# Patient Record
Sex: FEMALE | Race: WHITE | NOT HISPANIC OR LATINO
[De-identification: names, ages, dates, MRNs, and addresses within clinical notes are randomized per-mention and may not be internally consistent; named-entity substitution may affect disease eponyms.]

---

## 2021-08-11 ENCOUNTER — ASOB RESULT (OUTPATIENT)
Age: 26
End: 2021-08-11

## 2021-08-11 ENCOUNTER — APPOINTMENT (OUTPATIENT)
Dept: ANTEPARTUM | Facility: CLINIC | Age: 26
End: 2021-08-11
Payer: COMMERCIAL

## 2021-08-11 PROCEDURE — 76801 OB US < 14 WKS SINGLE FETUS: CPT

## 2021-08-11 PROCEDURE — 76813 OB US NUCHAL MEAS 1 GEST: CPT

## 2021-08-11 PROCEDURE — 36416 COLLJ CAPILLARY BLOOD SPEC: CPT

## 2021-10-06 ENCOUNTER — ASOB RESULT (OUTPATIENT)
Age: 26
End: 2021-10-06

## 2021-10-06 ENCOUNTER — APPOINTMENT (OUTPATIENT)
Dept: ANTEPARTUM | Facility: CLINIC | Age: 26
End: 2021-10-06
Payer: COMMERCIAL

## 2021-10-06 PROBLEM — Z00.00 ENCOUNTER FOR PREVENTIVE HEALTH EXAMINATION: Status: ACTIVE | Noted: 2021-10-06

## 2021-10-06 PROCEDURE — 76811 OB US DETAILED SNGL FETUS: CPT

## 2022-02-17 ENCOUNTER — OUTPATIENT (OUTPATIENT)
Dept: INPATIENT UNIT | Facility: HOSPITAL | Age: 27
LOS: 1 days | Discharge: HOME | End: 2022-02-17
Payer: COMMERCIAL

## 2022-02-17 VITALS
TEMPERATURE: 98 F | HEART RATE: 86 BPM | RESPIRATION RATE: 15 BRPM | SYSTOLIC BLOOD PRESSURE: 122 MMHG | DIASTOLIC BLOOD PRESSURE: 69 MMHG

## 2022-02-17 VITALS — HEART RATE: 86 BPM | SYSTOLIC BLOOD PRESSURE: 122 MMHG | DIASTOLIC BLOOD PRESSURE: 69 MMHG

## 2022-02-17 DIAGNOSIS — Z90.89 ACQUIRED ABSENCE OF OTHER ORGANS: Chronic | ICD-10-CM

## 2022-02-17 DIAGNOSIS — Z3A.00 WEEKS OF GESTATION OF PREGNANCY NOT SPECIFIED: ICD-10-CM

## 2022-02-17 DIAGNOSIS — O26.899 OTHER SPECIFIED PREGNANCY RELATED CONDITIONS, UNSPECIFIED TRIMESTER: ICD-10-CM

## 2022-02-17 PROCEDURE — 99202 OFFICE O/P NEW SF 15 MIN: CPT | Mod: 25

## 2022-02-17 PROCEDURE — 59025 FETAL NON-STRESS TEST: CPT | Mod: 26

## 2022-02-17 RX ORDER — SERTRALINE 25 MG/1
75 TABLET, FILM COATED ORAL
Qty: 0 | Refills: 0 | DISCHARGE

## 2022-02-17 NOTE — OB RN TRIAGE NOTE - NSNURSINGINSTR_OBGYN_ALL_OB_FT
pt evaluated for labor, early labor on exam, pt d/c to home with labor precautions.  instructions given by jett guillen d/w .

## 2022-02-17 NOTE — OB PROVIDER TRIAGE NOTE - HISTORY OF PRESENT ILLNESS
26y White Female  at 39w2d presents to triage c/o irregular contractions since 2am.  Reports +FM, no vaginal bleeding, no ROM or LOF  Prenatal care: [facility]; Denies any prenatal complications.

## 2022-02-17 NOTE — OB PROVIDER TRIAGE NOTE - NSHPPHYSICALEXAM_GEN_ALL_CORE
T(C): 36.8 (02-17-22 @ 20:18), Max: 36.8 (02-17-22 @ 20:18)  HR: 86 (02-17-22 @ 20:27) (86 - 86)  BP: 122/69 (02-17-22 @ 20:27) (122/69 - 122/69)  RR: 15 (02-17-22 @ 20:18) (15 - 15)    Heart: RRR  Lungs: CTA  Abdomen: Gravid, soft, NT    NST: Reactive with moderate variability, Category 1 tracing  [place]: Irregular contractions  VE: 1/50/-3, intact membranes

## 2022-02-17 NOTE — OB PROVIDER TRIAGE NOTE - PLAN OF CARE
D/c home with instructions  Labor precautions  Pt to monitor fetal kick counts  Pt to follow up with OB as scheduled  Pt to increase PO hydration  Pt instructed to return to triage if decreased fetal movements, strong contractions, vaginal bleeding or LOF.

## 2022-02-17 NOTE — OB RN TRIAGE NOTE - FALL HARM RISK - UNIVERSAL INTERVENTIONS
Bed in lowest position, wheels locked, appropriate side rails in place/Call bell, personal items and telephone in reach/Instruct patient to call for assistance before getting out of bed or chair/Non-slip footwear when patient is out of bed/[place] to call system/Physically safe environment - no spills, clutter or unnecessary equipment/Purposeful Proactive Rounding/Room/bathroom lighting operational, light cord in reach

## 2022-02-17 NOTE — OB PROVIDER TRIAGE NOTE - NSOBPROVIDERNOTE_OBGYN_ALL_OB_FT
26y White Female  at 39w2d, no evidence of labor at this time  Reassuring fetal status  d/w Dr Maynard  D/c home with instructions  Labor precautions  Pt to monitor fetal kick counts  Pt to follow up with OB as scheduled  Pt to increase PO hydration  Pt instructed to return to triage if decreased fetal movements, strong contractions, vaginal bleeding or LOF.

## 2022-02-18 ENCOUNTER — INPATIENT (INPATIENT)
Facility: HOSPITAL | Age: 27
LOS: 1 days | Discharge: HOME | End: 2022-02-20
Attending: OBSTETRICS & GYNECOLOGY | Admitting: OBSTETRICS & GYNECOLOGY

## 2022-02-18 ENCOUNTER — TRANSCRIPTION ENCOUNTER (OUTPATIENT)
Age: 27
End: 2022-02-18

## 2022-02-18 VITALS
DIASTOLIC BLOOD PRESSURE: 77 MMHG | RESPIRATION RATE: 16 BRPM | SYSTOLIC BLOOD PRESSURE: 114 MMHG | HEART RATE: 95 BPM | TEMPERATURE: 98 F

## 2022-02-18 DIAGNOSIS — Z3A.00 WEEKS OF GESTATION OF PREGNANCY NOT SPECIFIED: ICD-10-CM

## 2022-02-18 DIAGNOSIS — O26.899 OTHER SPECIFIED PREGNANCY RELATED CONDITIONS, UNSPECIFIED TRIMESTER: ICD-10-CM

## 2022-02-18 DIAGNOSIS — Z90.89 ACQUIRED ABSENCE OF OTHER ORGANS: Chronic | ICD-10-CM

## 2022-02-18 LAB
BASOPHILS # BLD AUTO: 0.03 K/UL — SIGNIFICANT CHANGE UP (ref 0–0.2)
BASOPHILS NFR BLD AUTO: 0.2 % — SIGNIFICANT CHANGE UP (ref 0–2)
BLD GP AB SCN SERPL QL: NEGATIVE — SIGNIFICANT CHANGE UP
COVID-19 SPIKE DOMAIN AB INTERP: POSITIVE
COVID-19 SPIKE DOMAIN ANTIBODY RESULT: >250 U/ML — HIGH
EOSINOPHIL # BLD AUTO: 0.04 K/UL — SIGNIFICANT CHANGE UP (ref 0–0.5)
EOSINOPHIL NFR BLD AUTO: 0.3 % — SIGNIFICANT CHANGE UP (ref 0–6)
HBV SURFACE AG SER-ACNC: SIGNIFICANT CHANGE UP
HCT VFR BLD CALC: 37.3 % — SIGNIFICANT CHANGE UP (ref 34.5–45)
HGB BLD-MCNC: 12.1 G/DL — SIGNIFICANT CHANGE UP (ref 11.5–15.5)
HIV 1+2 AB+HIV1 P24 AG SERPL QL IA: SIGNIFICANT CHANGE UP
IANC: 11.86 K/UL — HIGH (ref 1.5–8.5)
IMM GRANULOCYTES NFR BLD AUTO: 0.7 % — SIGNIFICANT CHANGE UP (ref 0–1.5)
LYMPHOCYTES # BLD AUTO: 1.49 K/UL — SIGNIFICANT CHANGE UP (ref 1–3.3)
LYMPHOCYTES # BLD AUTO: 10.5 % — LOW (ref 13–44)
MCHC RBC-ENTMCNC: 25.7 PG — LOW (ref 27–34)
MCHC RBC-ENTMCNC: 32.4 GM/DL — SIGNIFICANT CHANGE UP (ref 32–36)
MCV RBC AUTO: 79.4 FL — LOW (ref 80–100)
MONOCYTES # BLD AUTO: 0.65 K/UL — SIGNIFICANT CHANGE UP (ref 0–0.9)
MONOCYTES NFR BLD AUTO: 4.6 % — SIGNIFICANT CHANGE UP (ref 2–14)
NEUTROPHILS # BLD AUTO: 11.86 K/UL — HIGH (ref 1.8–7.4)
NEUTROPHILS NFR BLD AUTO: 83.7 % — HIGH (ref 43–77)
NRBC # BLD: 0 /100 WBCS — SIGNIFICANT CHANGE UP
NRBC # FLD: 0 K/UL — SIGNIFICANT CHANGE UP
PLATELET # BLD AUTO: 288 K/UL — SIGNIFICANT CHANGE UP (ref 150–400)
RBC # BLD: 4.7 M/UL — SIGNIFICANT CHANGE UP (ref 3.8–5.2)
RBC # FLD: 13.8 % — SIGNIFICANT CHANGE UP (ref 10.3–14.5)
RH IG SCN BLD-IMP: POSITIVE — SIGNIFICANT CHANGE UP
RH IG SCN BLD-IMP: POSITIVE — SIGNIFICANT CHANGE UP
SARS-COV-2 IGG+IGM SERPL QL IA: >250 U/ML — HIGH
SARS-COV-2 IGG+IGM SERPL QL IA: POSITIVE
SARS-COV-2 RNA SPEC QL NAA+PROBE: SIGNIFICANT CHANGE UP
T PALLIDUM AB TITR SER: NEGATIVE — SIGNIFICANT CHANGE UP
WBC # BLD: 14.17 K/UL — HIGH (ref 3.8–10.5)
WBC # FLD AUTO: 14.17 K/UL — HIGH (ref 3.8–10.5)

## 2022-02-18 RX ORDER — DIPHENHYDRAMINE HCL 50 MG
25 CAPSULE ORAL EVERY 6 HOURS
Refills: 0 | Status: DISCONTINUED | OUTPATIENT
Start: 2022-02-18 | End: 2022-02-20

## 2022-02-18 RX ORDER — SODIUM CHLORIDE 9 MG/ML
1000 INJECTION, SOLUTION INTRAVENOUS
Refills: 0 | Status: DISCONTINUED | OUTPATIENT
Start: 2022-02-18 | End: 2022-02-18

## 2022-02-18 RX ORDER — AER TRAVELER 0.5 G/1
1 SOLUTION RECTAL; TOPICAL
Qty: 0 | Refills: 0 | DISCHARGE
Start: 2022-02-18

## 2022-02-18 RX ORDER — ERGOCALCIFEROL 1.25 MG/1
0 CAPSULE ORAL
Qty: 0 | Refills: 0 | DISCHARGE

## 2022-02-18 RX ORDER — SERTRALINE 25 MG/1
75 TABLET, FILM COATED ORAL DAILY
Refills: 0 | Status: DISCONTINUED | OUTPATIENT
Start: 2022-02-18 | End: 2022-02-19

## 2022-02-18 RX ORDER — OXYTOCIN 10 UNIT/ML
333.33 VIAL (ML) INJECTION
Qty: 20 | Refills: 0 | Status: DISCONTINUED | OUTPATIENT
Start: 2022-02-18 | End: 2022-02-18

## 2022-02-18 RX ORDER — SODIUM CHLORIDE 9 MG/ML
3 INJECTION INTRAMUSCULAR; INTRAVENOUS; SUBCUTANEOUS EVERY 8 HOURS
Refills: 0 | Status: DISCONTINUED | OUTPATIENT
Start: 2022-02-18 | End: 2022-02-20

## 2022-02-18 RX ORDER — OXYCODONE HYDROCHLORIDE 5 MG/1
5 TABLET ORAL
Refills: 0 | Status: DISCONTINUED | OUTPATIENT
Start: 2022-02-18 | End: 2022-02-20

## 2022-02-18 RX ORDER — HYDROCORTISONE 1 %
1 OINTMENT (GRAM) TOPICAL
Qty: 0 | Refills: 0 | DISCHARGE
Start: 2022-02-18

## 2022-02-18 RX ORDER — ACETAMINOPHEN 500 MG
975 TABLET ORAL
Refills: 0 | Status: DISCONTINUED | OUTPATIENT
Start: 2022-02-18 | End: 2022-02-20

## 2022-02-18 RX ORDER — IBUPROFEN 200 MG
600 TABLET ORAL EVERY 6 HOURS
Refills: 0 | Status: COMPLETED | OUTPATIENT
Start: 2022-02-18 | End: 2023-01-17

## 2022-02-18 RX ORDER — TETANUS TOXOID, REDUCED DIPHTHERIA TOXOID AND ACELLULAR PERTUSSIS VACCINE, ADSORBED 5; 2.5; 8; 8; 2.5 [IU]/.5ML; [IU]/.5ML; UG/.5ML; UG/.5ML; UG/.5ML
0.5 SUSPENSION INTRAMUSCULAR ONCE
Refills: 0 | Status: DISCONTINUED | OUTPATIENT
Start: 2022-02-18 | End: 2022-02-20

## 2022-02-18 RX ORDER — AER TRAVELER 0.5 G/1
1 SOLUTION RECTAL; TOPICAL EVERY 4 HOURS
Refills: 0 | Status: DISCONTINUED | OUTPATIENT
Start: 2022-02-18 | End: 2022-02-20

## 2022-02-18 RX ORDER — SENNA PLUS 8.6 MG/1
1 TABLET ORAL
Refills: 0 | Status: DISCONTINUED | OUTPATIENT
Start: 2022-02-18 | End: 2022-02-20

## 2022-02-18 RX ORDER — DIBUCAINE 1 %
1 OINTMENT (GRAM) RECTAL
Qty: 0 | Refills: 0 | DISCHARGE
Start: 2022-02-18

## 2022-02-18 RX ORDER — OXYTOCIN 10 UNIT/ML
2 VIAL (ML) INJECTION
Qty: 30 | Refills: 0 | Status: DISCONTINUED | OUTPATIENT
Start: 2022-02-18 | End: 2022-02-18

## 2022-02-18 RX ORDER — IBUPROFEN 200 MG
600 TABLET ORAL EVERY 6 HOURS
Refills: 0 | Status: DISCONTINUED | OUTPATIENT
Start: 2022-02-18 | End: 2022-02-20

## 2022-02-18 RX ORDER — PRAMOXINE HYDROCHLORIDE 150 MG/15G
1 AEROSOL, FOAM RECTAL EVERY 4 HOURS
Refills: 0 | Status: DISCONTINUED | OUTPATIENT
Start: 2022-02-18 | End: 2022-02-20

## 2022-02-18 RX ORDER — KETOROLAC TROMETHAMINE 30 MG/ML
30 SYRINGE (ML) INJECTION ONCE
Refills: 0 | Status: DISCONTINUED | OUTPATIENT
Start: 2022-02-18 | End: 2022-02-18

## 2022-02-18 RX ORDER — BENZOCAINE 10 %
1 GEL (GRAM) MUCOUS MEMBRANE EVERY 6 HOURS
Refills: 0 | Status: DISCONTINUED | OUTPATIENT
Start: 2022-02-18 | End: 2022-02-20

## 2022-02-18 RX ORDER — DIBUCAINE 1 %
1 OINTMENT (GRAM) RECTAL EVERY 6 HOURS
Refills: 0 | Status: DISCONTINUED | OUTPATIENT
Start: 2022-02-18 | End: 2022-02-20

## 2022-02-18 RX ORDER — LANOLIN
1 OINTMENT (GRAM) TOPICAL
Qty: 0 | Refills: 0 | DISCHARGE
Start: 2022-02-18

## 2022-02-18 RX ORDER — HYDROCORTISONE 1 %
1 OINTMENT (GRAM) TOPICAL EVERY 6 HOURS
Refills: 0 | Status: DISCONTINUED | OUTPATIENT
Start: 2022-02-18 | End: 2022-02-20

## 2022-02-18 RX ORDER — LANOLIN
1 OINTMENT (GRAM) TOPICAL EVERY 6 HOURS
Refills: 0 | Status: DISCONTINUED | OUTPATIENT
Start: 2022-02-18 | End: 2022-02-20

## 2022-02-18 RX ORDER — SIMETHICONE 80 MG/1
80 TABLET, CHEWABLE ORAL EVERY 4 HOURS
Refills: 0 | Status: DISCONTINUED | OUTPATIENT
Start: 2022-02-18 | End: 2022-02-20

## 2022-02-18 RX ORDER — MAGNESIUM HYDROXIDE 400 MG/1
30 TABLET, CHEWABLE ORAL
Refills: 0 | Status: DISCONTINUED | OUTPATIENT
Start: 2022-02-18 | End: 2022-02-20

## 2022-02-18 RX ORDER — OXYCODONE HYDROCHLORIDE 5 MG/1
5 TABLET ORAL ONCE
Refills: 0 | Status: DISCONTINUED | OUTPATIENT
Start: 2022-02-18 | End: 2022-02-20

## 2022-02-18 RX ORDER — IBUPROFEN 200 MG
1 TABLET ORAL
Qty: 0 | Refills: 0 | DISCHARGE
Start: 2022-02-18

## 2022-02-18 RX ADMIN — SODIUM CHLORIDE 3 MILLILITER(S): 9 INJECTION INTRAMUSCULAR; INTRAVENOUS; SUBCUTANEOUS at 23:27

## 2022-02-18 RX ADMIN — Medication 1000 MILLIUNIT(S)/MIN: at 14:52

## 2022-02-18 RX ADMIN — Medication 975 MILLIGRAM(S): at 21:21

## 2022-02-18 RX ADMIN — Medication 975 MILLIGRAM(S): at 21:51

## 2022-02-18 RX ADMIN — Medication 600 MILLIGRAM(S): at 23:56

## 2022-02-18 RX ADMIN — Medication 30 MILLIGRAM(S): at 17:45

## 2022-02-18 RX ADMIN — Medication 30 MILLIGRAM(S): at 17:33

## 2022-02-18 RX ADMIN — Medication 2 MILLIUNIT(S)/MIN: at 09:11

## 2022-02-18 NOTE — OB RN PATIENT PROFILE - WEIGHT IN KG
Call [facility] First!  Call [facility] [phone] if you develop: fever 100.4 or greater, new/uncontrolled pain, uncontrolled side effects, or any other questions or concerns.  Remember to increase oral fluid intake to prevent dehydration.  Protect yourself from infection by washing your hands for 30 seconds and avoiding anyone who may be sick.  Please practice usage of face masks and social distancing to protect yourself and others.     73.5

## 2022-02-18 NOTE — DISCHARGE NOTE OB - PATIENT PORTAL LINK FT
You can access the FollowMyHealth Patient Portal offered by [facility] by registering at the following website: [email or web address]. By joining [facility]’s FollowMyHealth portal, you will also be able to view your health information using other applications (apps) compatible with our system.

## 2022-02-18 NOTE — OB PROVIDER DELIVERY SUMMARY - NSPROVIDERDELIVERYNOTE_OBGYN_ALL_OB_FT
Delivery of liveborn female infant from OA position. Head, shoulders, and body delivered without complication. Infant was suctioned. No mec. Delayed cord clamping and infant was passed to mother. Cord clamped and cut. Placenta delivered intact with a 3 vessel cord. Fundal massage was given and uterine fundus was found to be firm. Vaginal exam revealed an intact cervix, vaginal walls and sulci. Patient had a second degree laceration in the perineum that was repaired with 2.0 chromic suture. Excellent hemostasis was noted. Patient was stable and went to recovery. Count was correct x 2.

## 2022-02-18 NOTE — OB RN DELIVERY SUMMARY - NSSELHIDDEN_OBGYN_ALL_OB_FT
[NS_DeliveryAttending1_OBGYN_ALL_OB_FT:[id number]=],[NS_DeliveryRN_OBGYN_ALL_OB_FT:[id number]=],[NS_CirculateRN2_OBGYN_ALL_OB_FT:[id number]=]

## 2022-02-18 NOTE — OB RN PATIENT PROFILE - NSICDXNOPASTMEDICALHX_GEN_ALL_CORE
Spine appears normal, movement of extremities grossly intact. <-- Click to add NO pertinent Past Medical History

## 2022-02-18 NOTE — DISCHARGE NOTE OB - CARE PROVIDER_API CALL
Jenn Major)  Obstetrics and Gynecology  [address] [place] [address]  Phone: [phone]  Fax: [phone]  Follow Up Time: 1 month

## 2022-02-18 NOTE — OB PROVIDER H&P - NSHPPHYSICALEXAM_GEN_ALL_CORE
T(C): 36.7 (02-18-22 @ 04:09), Max: 36.8 (02-17-22 @ 20:18)  HR: 95 (02-18-22 @ 04:17) (86 - 95)  BP: 114/77 (02-18-22 @ 04:17) (114/77 - 122/69)  RR: 16 (02-18-22 @ 04:09) (15 - 16)    Heart: RRR  Lungs: CTA  Abdomen: Gravid, soft, NT    NST: Reactive with moderate variability, Category 1 tracing  [place]: Regular contractions q 2mins apart  VE: 5/80/-3, bulging membranes

## 2022-02-18 NOTE — OB RN DELIVERY SUMMARY - BABY A: APGAR 1 MIN SCORE, DELIVERY
8 Pt has a question regarding recent medication. Does she need to be seen again prior to any refills for the muscle relaxer?

## 2022-02-18 NOTE — DISCHARGE NOTE OB - NS AS DC FU INST LIST INST
[facility]  ED Nurse Handoff Report    ED Chief complaint: Generalized Weakness and Covid Concern      ED Diagnosis:   Final diagnoses:   Pneumonia due to 2019 novel coronavirus   Pneumonia of right lower lobe due to infectious organism   Encephalopathy   Acute respiratory failure with hypoxia (H)       Code Status: hospitalist to address    Allergies:   Allergies   Allergen Reactions     Gluten Meal GI Disturbance     Lactose GI Disturbance and Other (See Comments)     Dairy Products - brain fog     Mold Cough     Cough, sneeze, pneumonia     Prednisone Nausea and Vomiting     Acetaminophen Other (See Comments) and Rash     pt states she can take acetaminophen     Caffeine Palpitations and Other (See Comments)     Penicillins Other (See Comments) and Rash     Sulfa Drugs Other (See Comments) and Rash       Patient Story: patient BIBA from home. Pt has not been feeling well over last couple weeks: fatigue, cough, SOB and diarrhea, fever, intermittent confusion and trouble getting her words out. Patient took an at home covid test today which was positive. When EMS arrived pt was upper 80s on room air. Patient 88% on room air in ED, pt currently on 4-5L oxygen in ED and oxygen saturation between 92-93%. Patient medicated with ibuprofen and fever has come down. Since fever came down patient's mentation has improved. COVID test in ED positive.     Chest CT scan showing:   IMPRESSION:  1.  Multifocal ground-glass opacities suspicious for COVID-19. There is, however, more focal consolidation in the perihilar aspect of the right lower lobe that may represent superimposed bacterial pneumonia.  2.  No evidence of pulmonary embolism  Focused Assessment:  See above     Treatments and/or interventions provided: see MAR, oxygen on at 4-5L oxygen  Patient's response to treatments and/or interventions: mentation improved when fever was reduced    To be done/followed up on inpatient unit:  none at this time    Does  no this patient have any cognitive concerns?: Difficulty answering questions at times, intermittent confusion    Activity level - Baseline/Home:  Independent  Activity Level - Current:   Stand with Assist    Patient's Preferred language: English   Needed?: No    Isolation: COVID positive  Infection: COVID    Patient tested for COVID 19 prior to admission: YES  Bariatric?: No    Vital Signs:   Vitals:    11/28/21 1603 11/28/21 1605 11/28/21 1627 11/28/21 1731   BP: 124/68      Pulse: 87  82 76   Resp:  18 18 22   Temp:    99.8  F (37.7  C)   TempSrc:    Oral   SpO2:   94% 92%       Cardiac Rhythm:Cardiac Rhythm: Normal sinus rhythm    Was the PSS-3 completed:   Yes  What interventions are required if any?               Family Comments: admission and plan of care discussed with pt's daughter (info in demographics)  OBS brochure/video discussed/provided to patient/family: N/A              Name of person given brochure if not patient:               Relationship to patient:     For the majority of the shift this patient's behavior was Green.   Behavioral interventions performed were .    ED NURSE PHONE NUMBER: [phone]

## 2022-02-18 NOTE — OB PROVIDER H&P - ASSESSMENT
26y White Female  at 39w2d in active labor  GBS: Negative  D/w Dr Maynard  -Admit to labor and delivery  -Pain Management prn  -Cont EFM/[place]  -Admission labs: CBC, RPR, T&S  -IV hydration  -Clear liquid diet

## 2022-02-18 NOTE — OB RN PATIENT PROFILE - FALL HARM RISK - UNIVERSAL INTERVENTIONS
Bed in lowest position, wheels locked, appropriate side rails in place/Call bell, personal items and telephone in reach/Instruct patient to call for assistance before getting out of bed or chair/Non-slip footwear when patient is out of bed/[place] to call system/Physically safe environment - no spills, clutter or unnecessary equipment/Purposeful Proactive Rounding/Room/bathroom lighting operational, light cord in reach

## 2022-02-18 NOTE — DISCHARGE NOTE OB - NS MD DC FALL RISK RISK
For information on Fall & Injury Prevention, visit: [email or web address] OR  [email or web address] OR  https://www.cdc.gov/steadi/patient.html

## 2022-02-18 NOTE — CHART NOTE - NSCHARTNOTEFT_GEN_A_CORE
pt was evaluated at bedside   ve: 9.5/100/0 station   jayro every 2 minutes   peanutball placed   continue pitocin augmentation   anticipate vaginal delivery

## 2022-02-18 NOTE — OB RN DELIVERY SUMMARY - NS_SEPSISRSKCALC_OBGYN_ALL_OB_FT
EOS calculated successfully. EOS Risk Factor: 0.5/1000 live births ([facility] national incidence); GA=39w3d; Temp=99.14; ROM=8.85; GBS='Negative'; Antibiotics='No antibiotics or any antibiotics < 2 hrs prior to birth'

## 2022-02-18 NOTE — DISCHARGE NOTE OB - CARE PLAN
Principal Discharge DX:	 (normal spontaneous vaginal delivery)  Assessment and plan of treatment:	post partum care   1

## 2022-02-18 NOTE — OB RN TRIAGE NOTE - FALL HARM RISK - UNIVERSAL INTERVENTIONS
Bed in lowest position, wheels locked, appropriate side rails in place/Call bell, personal items and telephone in reach/Instruct patient to call for assistance before getting out of bed or chair/Non-slip footwear when patient is out of bed/[place] to call system/Physically safe environment - no spills, clutter or unnecessary equipment/Purposeful Proactive Rounding/Room/bathroom lighting operational, light cord in reach

## 2022-02-18 NOTE — OB PROVIDER H&P - HISTORY OF PRESENT ILLNESS
26y White Female  at 39w2d returns to triage c/o stronger and painful contractions  Reports +FM, no vaginal bleeding, no ROM or LOF  Prenatal care: [facility]; Denies any prenatal complications.  GBS: Negative 22

## 2022-02-18 NOTE — DISCHARGE NOTE OB - MEDICATION SUMMARY - MEDICATIONS TO TAKE
I will START or STAY ON the medications listed below when I get home from the hospital:    ibuprofen 600 mg oral tablet  -- 1 tab(s) by mouth every 6 hours  -- Indication: For     Zoloft  -- 75 milligram(s) by mouth once a day  -- Indication: For     hydrocortisone 1% topical cream  -- 1 application on skin every 6 hours, As needed, Moderate Pain (4-6)  -- Indication: For     dibucaine 1% topical ointment  -- 1 application on skin every 6 hours, As needed, Perineal discomfort  -- Indication: For     lanolin topical ointment  -- 1 application on skin every 6 hours, As needed, nipple soreness  -- Indication: For     witch hazel 50% topical pad  -- 1 application on skin every 4 hours, As needed, Perineal discomfort  -- Indication: For    I will START or STAY ON the medications listed below when I get home from the hospital:    ibuprofen 600 mg oral tablet  -- 1 tab(s) by mouth every 6 hours  -- Indication: For     acetaminophen 325 mg oral tablet  -- 3 tab(s) by mouth every 8 hours, As Needed  -- Indication: For  (normal spontaneous vaginal delivery)    Zoloft  -- 75 milligram(s) by mouth once a day  -- Indication: For     hydrocortisone 1% topical cream  -- 1 application on skin every 6 hours, As needed, Moderate Pain (4-6)  -- Indication: For     dibucaine 1% topical ointment  -- 1 application on skin every 6 hours, As needed, Perineal discomfort  -- Indication: For     lanolin topical ointment  -- 1 application on skin every 6 hours, As needed, nipple soreness  -- Indication: For     witch hazel 50% topical pad  -- 1 application on skin every 4 hours, As needed, Perineal discomfort  -- Indication: For

## 2022-02-18 NOTE — DISCHARGE NOTE OB - MATERIALS PROVIDED
Vaccinations/[facility]  Screening Program/  Immunization Record/Breastfeeding Log/Breastfeeding Mother’s Support Group Information/Guide to Postpartum Care/[facility] Hearing Screen Program/Back To Sleep Handout/Shaken Baby Prevention Handout/Breastfeeding Guide and Packet/Birth Certificate Instructions/Discharge Medication Information for Patients and Families Pocket Guide/Tdap Vaccination (VIS Pub Date: 2012)

## 2022-02-18 NOTE — OB PROVIDER H&P - PROBLEM SELECTOR PLAN 1
-Admit to labor and delivery  -Pain Management prn  -Cont EFM/[place]  -Admission labs: CBC, RPR, T&S  -IV hydration  -Clear liquid diet

## 2022-02-19 RX ORDER — SERTRALINE 25 MG/1
75 TABLET, FILM COATED ORAL DAILY
Refills: 0 | Status: DISCONTINUED | OUTPATIENT
Start: 2022-02-19 | End: 2022-02-20

## 2022-02-19 RX ORDER — ACETAMINOPHEN 500 MG
3 TABLET ORAL
Qty: 0 | Refills: 0 | DISCHARGE
Start: 2022-02-19

## 2022-02-19 RX ADMIN — Medication 600 MILLIGRAM(S): at 00:26

## 2022-02-19 RX ADMIN — SODIUM CHLORIDE 3 MILLILITER(S): 9 INJECTION INTRAMUSCULAR; INTRAVENOUS; SUBCUTANEOUS at 14:20

## 2022-02-19 RX ADMIN — Medication 975 MILLIGRAM(S): at 22:21

## 2022-02-19 RX ADMIN — Medication 1 TABLET(S): at 11:55

## 2022-02-19 RX ADMIN — Medication 600 MILLIGRAM(S): at 12:25

## 2022-02-19 RX ADMIN — SERTRALINE 75 MILLIGRAM(S): 25 TABLET, FILM COATED ORAL at 17:08

## 2022-02-19 RX ADMIN — Medication 600 MILLIGRAM(S): at 17:07

## 2022-02-19 RX ADMIN — SODIUM CHLORIDE 3 MILLILITER(S): 9 INJECTION INTRAMUSCULAR; INTRAVENOUS; SUBCUTANEOUS at 22:21

## 2022-02-19 RX ADMIN — Medication 975 MILLIGRAM(S): at 21:40

## 2022-02-19 RX ADMIN — Medication 600 MILLIGRAM(S): at 13:40

## 2022-02-19 RX ADMIN — Medication 600 MILLIGRAM(S): at 06:00

## 2022-02-19 RX ADMIN — Medication 600 MILLIGRAM(S): at 11:55

## 2022-02-19 RX ADMIN — Medication 975 MILLIGRAM(S): at 03:21

## 2022-02-19 RX ADMIN — Medication 975 MILLIGRAM(S): at 08:55

## 2022-02-19 RX ADMIN — Medication 975 MILLIGRAM(S): at 09:30

## 2022-02-19 RX ADMIN — SODIUM CHLORIDE 3 MILLILITER(S): 9 INJECTION INTRAMUSCULAR; INTRAVENOUS; SUBCUTANEOUS at 06:06

## 2022-02-19 RX ADMIN — Medication 600 MILLIGRAM(S): at 06:30

## 2022-02-19 RX ADMIN — SENNA PLUS 1 TABLET(S): 8.6 TABLET ORAL at 06:00

## 2022-02-19 RX ADMIN — Medication 975 MILLIGRAM(S): at 02:51

## 2022-02-19 NOTE — PROGRESS NOTE ADULT - ASSESSMENT
PPD 1 s/p  recovering appropriately   [] continue routine postpartum care   [] encourage ambulation   [] continue pain management PRN   [] discharge planning

## 2022-02-19 NOTE — LACTATION INITIAL EVALUATION - LACTATION INTERVENTIONS
initiate/review safe skin-to-skin/initiate/review hand expression/initiate/review techniques for position and latch/review techniques to increase milk supply/initiate/review finger suck/initiate/review breast massage/compression/initiate/review alternate feeding method/reviewed components of an effective feeding and at least 8 effective feedings per day required/reviewed importance of monitoring infant diapers, the breastfeeding log, and minimum output each day/reviewed risks of artificial nipples/reviewed benefits and recommendations for rooming in/reviewed feeding on demand/by cue at least 8 times a day/reviewed indications of inadequate milk transfer that would require supplementation

## 2022-02-19 NOTE — LACTATION INITIAL EVALUATION - INTERVENTION OUTCOME
Will follow up on infant less than 24 hrs. 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

## 2022-02-20 VITALS
SYSTOLIC BLOOD PRESSURE: 120 MMHG | OXYGEN SATURATION: 98 % | HEART RATE: 81 BPM | DIASTOLIC BLOOD PRESSURE: 80 MMHG | RESPIRATION RATE: 18 BRPM | TEMPERATURE: 98 F

## 2022-02-20 RX ADMIN — Medication 975 MILLIGRAM(S): at 03:17

## 2022-02-20 RX ADMIN — Medication 975 MILLIGRAM(S): at 03:50

## 2022-02-20 RX ADMIN — Medication 600 MILLIGRAM(S): at 08:39

## 2022-02-20 RX ADMIN — Medication 600 MILLIGRAM(S): at 09:09

## 2022-02-20 RX ADMIN — Medication 600 MILLIGRAM(S): at 00:09

## 2022-02-20 RX ADMIN — Medication 600 MILLIGRAM(S): at 00:40

## 2022-02-20 NOTE — PROGRESS NOTE ADULT - SUBJECTIVE AND OBJECTIVE BOX
patient seen today  doing well today PPD2 after   she si doing well  labs vitals reviewed, stable  discussed precautions with discharge  stable at this time for DC.
INTERVAL HPI/OVERNIGHT EVENTS: Pt seen and examined at bedside.  Doing well, denies complaints. +voiding without difficulty, +ambulation, candi reg diet. denies heavy lochia     MEDICATIONS  (STANDING):  acetaminophen     Tablet .. 975 milliGRAM(s) Oral <User Schedule>  diphtheria/tetanus/pertussis (acellular) Vaccine (ADAcel) 0.5 milliLiter(s) IntraMuscular once  ibuprofen  Tablet. 600 milliGRAM(s) Oral every 6 hours  prenatal multivitamin 1 Tablet(s) Oral daily  senna 1 Tablet(s) Oral two times a day  sertraline Concentrate 75 milliGRAM(s) Oral daily  sodium chloride 0.9% lock flush 3 milliLiter(s) IV Push every 8 hours    MEDICATIONS  (PRN):  benzocaine 20%/menthol 0.5% Spray 1 Spray(s) Topical every 6 hours PRN for Perineal discomfort  dibucaine 1% Ointment 1 Application(s) Topical every 6 hours PRN Perineal discomfort  diphenhydrAMINE 25 milliGRAM(s) Oral every 6 hours PRN Pruritus  hydrocortisone 1% Cream 1 Application(s) Topical every 6 hours PRN Moderate Pain (4-6)  lanolin Ointment 1 Application(s) Topical every 6 hours PRN nipple soreness  magnesium hydroxide Suspension 30 milliLiter(s) Oral two times a day PRN Constipation  oxyCODONE    IR 5 milliGRAM(s) Oral every 3 hours PRN Moderate to Severe Pain (4-10)  oxyCODONE    IR 5 milliGRAM(s) Oral once PRN Moderate to Severe Pain (4-10)  pramoxine 1%/zinc 5% Cream 1 Application(s) Topical every 4 hours PRN Moderate Pain (4-6)  simethicone 80 milliGRAM(s) Chew every 4 hours PRN Gas  witch hazel Pads 1 Application(s) Topical every 4 hours PRN Perineal discomfort      Vital Signs Last 24 Hrs  T(C): 36.8 (19 Feb 2022 05:48), Max: 37.3 (18 Feb 2022 11:26)  T(F): 98.2 (19 Feb 2022 05:48), Max: 99.14 (18 Feb 2022 11:26)  HR: 92 (19 Feb 2022 05:48) (69 - 117)  BP: 114/74 (19 Feb 2022 05:48) (92/52 - 131/84)  BP(mean): --  RR: 18 (19 Feb 2022 05:48) (17 - 18)  SpO2: 98% (19 Feb 2022 05:48) (83% - 100%)    PHYSICAL EXAM:    GA: NAD, A+0 x 3  Abd: ( + ) BS, soft, nontender, nondistended, no rebound or guarding,   Uterus: Fundus midline; firm    LABS:                        12.1   14.17 )-----------( 288      ( 18 Feb 2022 05:24 )             37.3                   RADIOLOGY & ADDITIONAL TESTS:

## 2022-06-03 NOTE — OB PROVIDER H&P - NSRISKFACTORS_OBGYN_ALL_OB
I11 0  [facility] 75  coding opportunities          Chart Reviewed number of suggestions sent to Provider: 1     Patients Insurance     Medicare Insurance: [employer]
No

## 2022-10-04 NOTE — OB RN PATIENT PROFILE - NS_PRENATALLABSOURCEGBS36_OBGYN_ALL_OB
Current Issues/Problems reviewed on call:Patient's nephewreported pt will need transportation when he moves to [place].  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    Call completed with: Patient's nephew, Darren COLÓN.    Social Determinants of Health Identified: Coping/Stress and Transportation Needs    Community Resources Needed? Yes   If Yes, what resources were provided?  Maria Antonia recommended nephew have pt complete POA of Property.  Sw suggested looking for homemaker at local organizations or churches.  Sw noted that nephew could request a Care manager from Dr Herndon if he wants someone to monitor patient's medical conditions.    Time Frame for Follow up if needed: No future follow up needed due to Nephew agreed to call sw as needed.    SW Goal reviewed with patient. Patient agrees with the MARIA ANTONIA plan of care and call follow-up plan.     Care Plan reviewed with Dr Herndon on 10/4/22 via EMR   hard copy, drawn during this pregnancy

## 2023-06-02 NOTE — LACTATION INITIAL EVALUATION - BREAST TRAUMA OR BURNS
Patient states [facility] told him doctor cancel his Diltiazem. Patient would like to know why.    no

## 2024-06-05 PROBLEM — Z78.9 OTHER SPECIFIED HEALTH STATUS: Chronic | Status: ACTIVE | Noted: 2022-02-17

## 2024-06-13 ENCOUNTER — LABORATORY RESULT (OUTPATIENT)
Age: 29
End: 2024-06-13

## 2024-06-14 ENCOUNTER — ASOB RESULT (OUTPATIENT)
Age: 29
End: 2024-06-14

## 2024-06-14 ENCOUNTER — APPOINTMENT (OUTPATIENT)
Dept: ANTEPARTUM | Facility: CLINIC | Age: 29
End: 2024-06-14
Payer: COMMERCIAL

## 2024-06-14 PROCEDURE — 76813 OB US NUCHAL MEAS 1 GEST: CPT | Mod: 59

## 2024-06-14 PROCEDURE — 36415 COLL VENOUS BLD VENIPUNCTURE: CPT

## 2024-06-14 PROCEDURE — 76801 OB US < 14 WKS SINGLE FETUS: CPT

## 2024-08-09 ENCOUNTER — APPOINTMENT (OUTPATIENT)
Dept: ANTEPARTUM | Facility: CLINIC | Age: 29
End: 2024-08-09

## 2024-08-09 ENCOUNTER — ASOB RESULT (OUTPATIENT)
Age: 29
End: 2024-08-09

## 2024-08-09 PROCEDURE — 76811 OB US DETAILED SNGL FETUS: CPT

## 2024-12-17 ENCOUNTER — INPATIENT (INPATIENT)
Facility: HOSPITAL | Age: 29
LOS: 1 days | Discharge: HOME | End: 2024-12-19
Attending: OBSTETRICS & GYNECOLOGY | Admitting: OBSTETRICS & GYNECOLOGY

## 2024-12-17 VITALS
TEMPERATURE: 99 F | RESPIRATION RATE: 18 BRPM | DIASTOLIC BLOOD PRESSURE: 75 MMHG | HEIGHT: 63 IN | SYSTOLIC BLOOD PRESSURE: 116 MMHG | HEART RATE: 80 BPM | WEIGHT: 169.98 LBS

## 2024-12-17 DIAGNOSIS — Z34.90 ENCOUNTER FOR SUPERVISION OF NORMAL PREGNANCY, UNSPECIFIED, UNSPECIFIED TRIMESTER: ICD-10-CM

## 2024-12-17 DIAGNOSIS — Z90.89 ACQUIRED ABSENCE OF OTHER ORGANS: Chronic | ICD-10-CM

## 2024-12-17 LAB
BASOPHILS # BLD AUTO: 0.02 K/UL — SIGNIFICANT CHANGE UP (ref 0–0.2)
BASOPHILS NFR BLD AUTO: 0.2 % — SIGNIFICANT CHANGE UP (ref 0–2)
EOSINOPHIL # BLD AUTO: 0.07 K/UL — SIGNIFICANT CHANGE UP (ref 0–0.5)
EOSINOPHIL NFR BLD AUTO: 0.8 % — SIGNIFICANT CHANGE UP (ref 0–6)
HCT VFR BLD CALC: 30.4 % — LOW (ref 34.5–45)
HGB BLD-MCNC: 9.9 G/DL — LOW (ref 11.5–15.5)
IANC: 6.51 K/UL — SIGNIFICANT CHANGE UP (ref 1.8–7.4)
IMM GRANULOCYTES NFR BLD AUTO: 0.3 % — SIGNIFICANT CHANGE UP (ref 0–0.9)
LYMPHOCYTES # BLD AUTO: 1.87 K/UL — SIGNIFICANT CHANGE UP (ref 1–3.3)
LYMPHOCYTES # BLD AUTO: 20.5 % — SIGNIFICANT CHANGE UP (ref 13–44)
MCHC RBC-ENTMCNC: 25.1 PG — LOW (ref 27–34)
MCHC RBC-ENTMCNC: 32.6 G/DL — SIGNIFICANT CHANGE UP (ref 32–36)
MCV RBC AUTO: 77 FL — LOW (ref 80–100)
MONOCYTES # BLD AUTO: 0.63 K/UL — SIGNIFICANT CHANGE UP (ref 0–0.9)
MONOCYTES NFR BLD AUTO: 6.9 % — SIGNIFICANT CHANGE UP (ref 2–14)
NEUTROPHILS # BLD AUTO: 6.51 K/UL — SIGNIFICANT CHANGE UP (ref 1.8–7.4)
NEUTROPHILS NFR BLD AUTO: 71.3 % — SIGNIFICANT CHANGE UP (ref 43–77)
NRBC # BLD: 0 /100 WBCS — SIGNIFICANT CHANGE UP (ref 0–0)
NRBC # FLD: 0 K/UL — SIGNIFICANT CHANGE UP (ref 0–0)
PLATELET # BLD AUTO: 290 K/UL — SIGNIFICANT CHANGE UP (ref 150–400)
RBC # BLD: 3.95 M/UL — SIGNIFICANT CHANGE UP (ref 3.8–5.2)
RBC # FLD: 12.9 % — SIGNIFICANT CHANGE UP (ref 10.3–14.5)
WBC # BLD: 9.13 K/UL — SIGNIFICANT CHANGE UP (ref 3.8–10.5)
WBC # FLD AUTO: 9.13 K/UL — SIGNIFICANT CHANGE UP (ref 3.8–10.5)

## 2024-12-17 RX ORDER — 0.9 % SODIUM CHLORIDE 0.9 %
1000 INTRAVENOUS SOLUTION INTRAVENOUS
Refills: 0 | Status: DISCONTINUED | OUTPATIENT
Start: 2024-12-17 | End: 2024-12-18

## 2024-12-17 RX ORDER — CHLORHEXIDINE GLUCONATE 1.2 MG/ML
1 RINSE ORAL DAILY
Refills: 0 | Status: DISCONTINUED | OUTPATIENT
Start: 2024-12-17 | End: 2024-12-18

## 2024-12-17 RX ADMIN — Medication 125 MILLILITER(S): at 23:34

## 2024-12-17 RX ADMIN — Medication 2 MILLIUNIT(S)/MIN: at 23:11

## 2024-12-17 RX ADMIN — CHLORHEXIDINE GLUCONATE 1 APPLICATION(S): 1.2 RINSE ORAL at 18:10

## 2024-12-17 NOTE — OB PROVIDER H&P - NSHPSOCIALHISTORY_GEN_ALL_CORE
History of cigarette smoking, stopped at the beginning of the pregnancy   Denies history of illicit drug use and alcohol use     Verbalizes anxiety/depression on Lexapro 10mg     Denies history of abuse

## 2024-12-17 NOTE — OB RN PATIENT PROFILE - FUNCTIONAL ASSESSMENT - DAILY ACTIVITY 6.
Skin normal color for race, warm, dry and intact. No evidence of rash.
4 = No assist / stand by assistance

## 2024-12-17 NOTE — OB PROVIDER H&P - NSLOWPPHRISK_OBGYN_A_OB
No previous uterine incision/[place] Pregnancy/Less than or equal to 4 previous vaginal births/No known bleeding disorder/No history of postpartum hemorrhage/No other PPH risks indicated

## 2024-12-17 NOTE — OB RN PATIENT PROFILE - NSSDOHHELPREAD_OBGYN_A_OB
[facility] Hospitalist Daily Progress Note       SUBJECTIVE  Adela is a pleasant 55 y/o. Her main concern is the fact that her hand tingling is getting worse, as well as her bilateral leg pain. She is also inquiring about her fluid restriction and how necessary it is and would like to talk to nephrology. She mentions that she was given lasix last night and experienced a great decrease in abdominal fluyid, but no change in her leg swelling. She would also like to clarify when she is going home and discharge plans.      OBJECTIVE    I/O's    Intake/Output Summary (Last 24 hours) at 6/6/2022 0909  Last data filed at 6/6/2022 0600  Gross per 24 hour   Intake 477 ml   Output 1450 ml   Net -973 ml       Last Recorded Vitals  Temp:  [98.2 °F (36.8 °C)-98.4 °F (36.9 °C)] 98.2 °F (36.8 °C)  Heart Rate:  [] 98  Resp:  [16] 16  BP: (109-129)/(72-79) 113/72   Body mass index is 17.25 kg/m².    Review of Systems     Review of Systems   Constitutional: Negative for appetite change, chills, fatigue and fever.   HENT: Negative.    Eyes: Negative.    Respiratory: Negative for cough, choking, chest tightness, shortness of breath and wheezing.    Cardiovascular: Positive for leg swelling. Negative for chest pain and palpitations.   Gastrointestinal: Negative for abdominal pain, diarrhea, nausea and vomiting.   Endocrine: Negative.    Genitourinary: Negative.    Musculoskeletal: Positive for arthralgias, joint swelling and myalgias.   Skin: Negative.    Allergic/Immunologic: Negative.    Neurological: Negative.    Hematological: Negative.    Psychiatric/Behavioral: Negative.         Physical Exam     Physical Exam  Constitutional:       Appearance: Normal appearance.   HENT:      Head: Normocephalic and atraumatic.      Nose: Nose normal.      Mouth/Throat:      Mouth: Mucous membranes are moist.      Pharynx: Oropharynx is clear.   Eyes:      Extraocular Movements: Extraocular movements intact.      Pupils:  Pupils are equal, round, and reactive to light.   Cardiovascular:      Rate and Rhythm: Normal rate and regular rhythm.      Pulses: Normal pulses.      Heart sounds: Normal heart sounds.   Pulmonary:      Effort: Pulmonary effort is normal.   Abdominal:      General: Bowel sounds are normal.      Palpations: Abdomen is soft.   Musculoskeletal:         General: Swelling and tenderness present.      Cervical back: Normal range of motion and neck supple.      Right lower leg: Edema present.      Left lower leg: Edema present.   Skin:     General: Skin is warm and dry.   Neurological:      General: No focal deficit present.      Mental Status: She is alert and oriented to person, place, and time.      Motor: Weakness present.   Psychiatric:         Mood and Affect: Mood normal.         Behavior: Behavior normal.         Thought Content: Thought content normal.         Labs     Recent Results (from the past 24 hour(s))   Magnesium    Collection Time: 06/06/22  4:20 AM   Result Value Ref Range    Magnesium 1.9 1.7 - 2.4 mg/dL   Comprehensive Metabolic Panel    Collection Time: 06/06/22  4:20 AM   Result Value Ref Range    Fasting Status      Sodium 139 135 - 145 mmol/L    Potassium 4.0 3.4 - 5.1 mmol/L    Chloride 103 97 - 110 mmol/L    Carbon Dioxide 33 (H) 21 - 32 mmol/L    Anion Gap 7 7 - 19 mmol/L    Glucose 77 70 - 99 mg/dL    BUN 10 6 - 20 mg/dL    Creatinine 0.33 (L) 0.51 - 0.95 mg/dL    Glomerular Filtration Rate >90 >=60    BUN/ Creatinine Ratio 30 (H) 7 - 25    Calcium 8.0 (L) 8.4 - 10.2 mg/dL    Bilirubin, Total 0.2 0.2 - 1.0 mg/dL    GOT/AST 25 <=37 Units/L    GPT/ALT 14 <64 Units/L    Alkaline Phosphatase 95 45 - 117 Units/L    Albumin 1.4 (L) 3.6 - 5.1 g/dL    Protein, Total 5.5 (L) 6.4 - 8.2 g/dL    Globulin 4.1 (H) 2.0 - 4.0 g/dL    A/G Ratio 0.3 (L) 1.0 - 2.4   CBC with Automated Differential (performable only)    Collection Time: 06/06/22  4:20 AM   Result Value Ref Range    WBC 6.2 4.2 - 11.0 K/mcL     RBC 2.29 (L) 4.00 - 5.20 mil/mcL    HGB 7.7 (L) 12.0 - 15.5 g/dL    HCT 23.1 (L) 36.0 - 46.5 %    .9 (H) 78.0 - 100.0 fl    MCH 33.6 26.0 - 34.0 pg    MCHC 33.3 32.0 - 36.5 g/dL    RDW-CV 18.3 (H) 11.0 - 15.0 %    RDW-SD 65.2 (H) 39.0 - 50.0 fL     140 - 450 K/mcL    NRBC 0 <=0 /100 WBC    Neutrophil, Percent 58 %    Lymphocytes, Percent 31 %    Mono, Percent 8 %    Eosinophils, Percent 1 %    Basophils, Percent 1 %    Immature Granulocytes 1 %    Absolute Neutrophils 3.6 1.8 - 7.7 K/mcL    Absolute Lymphocytes 1.9 1.0 - 4.0 K/mcL    Absolute Monocytes 0.5 0.3 - 0.9 K/mcL    Absolute Eosinophils  0.1 0.0 - 0.5 K/mcL    Absolute Basophils 0.1 0.0 - 0.3 K/mcL    Absolute Immmature Granulocytes 0.0 0.0 - 0.2 K/mcL       Imaging     No results found.    Assessment and Plan     Principal Problem:    Hyponatremia  Active Problems:    Pulmonary mass    Quadriparesis (CMS/HCC)    Protein-calorie malnutrition, severe (CMS/HCC)    Hypoalbuminemia    Pleural effusion    Ready to quit: Not Answered  Counseling given: Not Answered     Pulmonary nodules  abnormal ct chest  Right upper lobe central cavitation  Will need biopsy  Highly suggestive of maligancy  pulm eval Dr Turcios  mri head neg for metastaic dx or acute findings  PET scan as o/p        New ascites 2/2 liver dx  and anacarca 2/2 hypoalbuminemia  Per CT abd pelvis last pm: New moderate to large volume ascites with anasarca.  Pt given albumin  25%   Albumin level 1.6 today  IV lasix 20 mg Qdaily, PO upon discharge  Cont fluid restiction     Lower ext edema bilaterally  bilaterl lower ext dopplers  Pt is on diuretics 20IV Qdaily  Appreciate cardio consult     Pleural effusions  Moderate left pleural effusion -small right pleural effusion  Thoracentisis 6/3 with 400cc fluid removed  Pulmonary following      Hypomagnesia resolved  Mag level 1.9  Cont to monitor        Subacute proximal quadriparesis with Chronic parethesisa of distal limbs and areflexia    Neurology following   IVIG trial last dose today  LP completed 5/26/2022   CT chest abd pelvis  High Rhematoid factor, copper level low elevated lead level, add ceruloplasmin        Lead poisoning  Toxicology follow up as o/p with Dr Jackson at [facility].  Poison control contacted per RN and they consider normal high around 10, pt level was12.5 recheck level today        SVT likely d/t not receiving bb this am d/t being at procedure.   Pt sustained hr of 150's for about 6 minutes this am  Pt had yet received am metoprolol and received it late d/t she left procedure about 830 this am  Pt hr corrected to 90's before metoprolol was given  D/w Dr Real and pt can follow up with her as o/p. But if it happens again she will see pt as inpt.      UTI  U/a positive for leuk est, , bacteria, leukocytes  culture resutls >100,000 multiple organisms isolated, no predominant type consistent with contamination   pro calcitonin 0.23  Ceftriaxone to continue started 5/24 pm     Left rib pain  lidoderm patch prn  Heating pad prn       Hypertension  Cont current management      HL   Cont statin      CAD with hx of MI, and cardiac stents  Cont current management      Hx of spinal sugery  Cont current management      Nicotine dependence  Smoking cessation counseling     Functional weakness d/t deconditioning  PT eval and treatment starting today        Severe protein calorie malnutrition   RD following for diet supplementation recommendations    DVT Prophylaxis  scds still not worn by pt  Educate patient on necessity of SCDs  Apply SCDs if le dopper neg  Consider lovenox however hg dropped and thus will hold off on that   option           ELLIE TROY MD FACP  Internal Medicine Hospitalist  [facility]  24 Hr Hospitalist Service with Same Physician Continuity of Care  [email or web address]  [phone] [place] 24hr Answering Service         no

## 2024-12-17 NOTE — OB PROVIDER H&P - NSHPPHYSICALEXAM_GEN_ALL_CORE
T(C): 37.1 (12-17-24 @ 18:22), Max: 37.1 (12-17-24 @ 18:01)  HR: 81 (12-17-24 @ 19:47) (75 - 90)  BP: 116/75 (12-17-24 @ 18:08) (116/75 - 116/75)  RR: 18 (12-17-24 @ 18:01) (18 - 18)  SpO2: 98% (12-17-24 @ 19:47) (97% - 99%)    Physical Exam  Gen: NAD  Cardiac: RRR  Pulm: Unlabored, breathing comfortably on room air  Abdomen: soft, nontender, gravid    TAUS: cephalic  VE:1/60/-3  EFM: 155bpm/ moderate variability/+accels, no decels/ Reactive NST   [place]: Irregular contractions

## 2024-12-17 NOTE — OB PROVIDER H&P - NS_OBGYNHISTORY_OBGYN_ALL_OB_FT
Bikantasylwia message sent to patient. He needs to be seen every 3 months for controlled substances in order to continue getting refills.   OBHx     Female 6lbs 15oz uncomplicated     GYNHx   History of abnormal pap smear with HPV last year colposcopy-negative.  Abnormal pap smear this year to follow up postpartum.  Denies history of fibroids, ovarian cysts, STDs

## 2024-12-17 NOTE — OB PROVIDER H&P - NSICDXPASTMEDICALHX_GEN_ALL_CORE_FT
PAST MEDICAL HISTORY:  [place]     Anxiety and depression     Hemorrhoids     History of HPV infection

## 2024-12-17 NOTE — OB PROVIDER H&P - HISTORY OF PRESENT ILLNESS
30y/o @ 39wks gestation presents for scheduled elective IOL.  Patient denies contraction pain, vaginal bleeding, vaginal leaking, headache, chest pain, blurred vision, dizziness, epigastric pain. Endorses +FM     PNC: Dr Newell, low risk   GBS negative: 2024  History of anxiety/depression on lexapro 10mg daily

## 2024-12-17 NOTE — OB RN PATIENT PROFILE - NSICDXPASTMEDICALHX_GEN_ALL_CORE_FT
PAST MEDICAL HISTORY:  [place]     Anxiety and depression     Hemorrhoids     History of HPV infection

## 2024-12-17 NOTE — OB RN PATIENT PROFILE - FALL HARM RISK - UNIVERSAL INTERVENTIONS
Bed in lowest position, wheels locked, appropriate side rails in place/Call bell, personal items and telephone in reach/Instruct patient to call for assistance before getting out of bed or chair/Non-slip footwear when patient is out of bed/[place] to call system/Physically safe environment - no spills, clutter or unnecessary equipment/Purposeful Proactive Rounding/Room/bathroom lighting operational, light cord in reach

## 2024-12-17 NOTE — OB PROVIDER H&P - ASSESSMENT
30y/o  @39wks gestation admit for elective scheduled IOL     -Admit to L&D   -Routine labs   -IV fluids   -Clear liquid diet   -Plan for buccal Cytotec x1 dose followed by Pitocin  Blood transfusion and induction/labor consents obtained. Risks, benefits, alternatives and possible complications have been discussed in detail with the patient in her native language. Pre-admission, admission, and post admission procedures and expectations were discussed in detail. All questions answered, all appropriate hospital consents were signed. Anticipate normal vaginal delivery. Informed Consent was obtained. The following was discussed:     Induction/Augmentation of Labor: Use of medication and/or cook balloon to begin or enhance labor  Obstetrical management including internal fetal/contraction monitoring  Normal vaginal delivery  Possible  Section: (surgical cut in the abdomen in order to deliver the baby)    D/W Dr. Rio Atkinson, NP   28y/o  @39wks gestation admit for elective scheduled IOL     -Admit to L&D   -Routine labs   -IV fluids   -Clear liquid diet   -Plan for buccal Cytotec x1 dose followed by Pitocin  Blood transfusion and induction/labor consents obtained. Risks, benefits, alternatives and possible complications have been discussed in detail with the patient in her native language. Pre-admission, admission, and post admission procedures and expectations were discussed in detail. All questions answered, all appropriate hospital consents were signed. Anticipate normal vaginal delivery. Informed Consent was obtained. The following was discussed:     Induction/Augmentation of Labor: Use of medication and/or cook balloon to begin or enhance labor  Obstetrical management including internal fetal/contraction monitoring  Normal vaginal delivery  Possible  Section: (surgical cut in the abdomen in order to deliver the baby)    D/W Dr. Rio Atkinson, NP      ++attending addendum++  agree w above  buccal cyto x1 recheck 3hrs or prn

## 2024-12-18 ENCOUNTER — TRANSCRIPTION ENCOUNTER (OUTPATIENT)
Age: 29
End: 2024-12-18

## 2024-12-18 LAB — T PALLIDUM AB TITR SER: NEGATIVE — SIGNIFICANT CHANGE UP

## 2024-12-18 RX ORDER — .BETA.-CAROTENE, SODIUM ACETATE, ASCORBIC ACID, CHOLECALCIFEROL, .ALPHA.-TOCOPHEROL ACETATE, DL-, THIAMINE MONONITRATE, RIBOFLAVIN, NIACINAMIDE, PYRIDOXINE HYDROCHLORIDE, FOLIC ACID, CYANOCOBALAMIN, CALCIUM CARBONATE, FERROUS FUMARATE, ZINC OXIDE AND CUPRIC OXIDE 2000; 2000; 120; 400; 22; 1.84; 3; 20; 10; 1; 12; 200; 27; 25; 2 [IU]/1; [IU]/1; MG/1; [IU]/1; MG/1; MG/1; MG/1; MG/1; MG/1; MG/1; UG/1; MG/1; MG/1; MG/1; MG/1
1 TABLET ORAL DAILY
Refills: 0 | Status: DISCONTINUED | OUTPATIENT
Start: 2024-12-18 | End: 2024-12-19

## 2024-12-18 RX ORDER — DIBUCAINE 1 %
1 OINTMENT (GRAM) TOPICAL EVERY 6 HOURS
Refills: 0 | Status: DISCONTINUED | OUTPATIENT
Start: 2024-12-18 | End: 2024-12-18

## 2024-12-18 RX ORDER — SIMETHICONE 125 MG
80 CAPSULE ORAL EVERY 4 HOURS
Refills: 0 | Status: DISCONTINUED | OUTPATIENT
Start: 2024-12-18 | End: 2024-12-19

## 2024-12-18 RX ORDER — OXYCODONE HYDROCHLORIDE 30 MG/1
5 TABLET ORAL ONCE
Refills: 0 | Status: DISCONTINUED | OUTPATIENT
Start: 2024-12-18 | End: 2024-12-19

## 2024-12-18 RX ORDER — BENZOCAINE 10 %
1 OINTMENT (GRAM) TOPICAL EVERY 6 HOURS
Refills: 0 | Status: DISCONTINUED | OUTPATIENT
Start: 2024-12-18 | End: 2024-12-18

## 2024-12-18 RX ORDER — ACETAMINOPHEN 500MG 500 MG/1
1000 TABLET, COATED ORAL ONCE
Refills: 0 | Status: DISCONTINUED | OUTPATIENT
Start: 2024-12-18 | End: 2024-12-18

## 2024-12-18 RX ORDER — LANOLIN 72 %
1 OINTMENT (GRAM) TOPICAL EVERY 6 HOURS
Refills: 0 | Status: DISCONTINUED | OUTPATIENT
Start: 2024-12-18 | End: 2024-12-19

## 2024-12-18 RX ORDER — DIBUCAINE 1 %
1 OINTMENT (GRAM) TOPICAL EVERY 6 HOURS
Refills: 0 | Status: DISCONTINUED | OUTPATIENT
Start: 2024-12-18 | End: 2024-12-19

## 2024-12-18 RX ORDER — LANOLIN 72 %
1 OINTMENT (GRAM) TOPICAL EVERY 6 HOURS
Refills: 0 | Status: DISCONTINUED | OUTPATIENT
Start: 2024-12-18 | End: 2024-12-18

## 2024-12-18 RX ORDER — SODIUM CHLORIDE 9 MG/ML
3 INJECTION, SOLUTION INTRAMUSCULAR; INTRAVENOUS; SUBCUTANEOUS EVERY 8 HOURS
Refills: 0 | Status: DISCONTINUED | OUTPATIENT
Start: 2024-12-18 | End: 2024-12-19

## 2024-12-18 RX ORDER — BENZOCAINE 10 %
1 OINTMENT (GRAM) TOPICAL EVERY 6 HOURS
Refills: 0 | Status: DISCONTINUED | OUTPATIENT
Start: 2024-12-18 | End: 2024-12-19

## 2024-12-18 RX ORDER — ACETAMINOPHEN 500MG 500 MG/1
975 TABLET, COATED ORAL
Refills: 0 | Status: DISCONTINUED | OUTPATIENT
Start: 2024-12-18 | End: 2024-12-19

## 2024-12-18 RX ORDER — IBUPROFEN 200 MG
600 TABLET ORAL EVERY 6 HOURS
Refills: 0 | Status: DISCONTINUED | OUTPATIENT
Start: 2024-12-18 | End: 2024-12-19

## 2024-12-18 RX ORDER — WITCH HAZEL 50 G/100ML
1 SOLUTION RECTAL EVERY 4 HOURS
Refills: 0 | Status: DISCONTINUED | OUTPATIENT
Start: 2024-12-18 | End: 2024-12-18

## 2024-12-18 RX ORDER — ESCITALOPRAM OXALATE 10 MG/1
10 TABLET, FILM COATED ORAL DAILY
Refills: 0 | Status: DISCONTINUED | OUTPATIENT
Start: 2024-12-18 | End: 2024-12-19

## 2024-12-18 RX ORDER — ACETAMINOPHEN 500MG 500 MG/1
3 TABLET, COATED ORAL
Qty: 0 | Refills: 0 | DISCHARGE
Start: 2024-12-18

## 2024-12-18 RX ORDER — TETANUS TOXOID, REDUCED DIPHTHERIA TOXOID AND ACELLULAR PERTUSSIS VACCINE, ADSORBED 5; 2.5; 8; 8; 2.5 [IU]/.5ML; [IU]/.5ML; UG/.5ML; UG/.5ML; UG/.5ML
0.5 SUSPENSION INTRAMUSCULAR ONCE
Refills: 0 | Status: DISCONTINUED | OUTPATIENT
Start: 2024-12-18 | End: 2024-12-18

## 2024-12-18 RX ORDER — IBUPROFEN 200 MG
1 TABLET ORAL
Qty: 0 | Refills: 0 | DISCHARGE
Start: 2024-12-18

## 2024-12-18 RX ORDER — IBUPROFEN 200 MG
600 TABLET ORAL EVERY 6 HOURS
Refills: 0 | Status: COMPLETED | OUTPATIENT
Start: 2024-12-18 | End: 2025-11-16

## 2024-12-18 RX ORDER — OXYCODONE HYDROCHLORIDE 30 MG/1
5 TABLET ORAL
Refills: 0 | Status: DISCONTINUED | OUTPATIENT
Start: 2024-12-18 | End: 2024-12-19

## 2024-12-18 RX ORDER — .BETA.-CAROTENE, SODIUM ACETATE, ASCORBIC ACID, CHOLECALCIFEROL, .ALPHA.-TOCOPHEROL ACETATE, DL-, THIAMINE MONONITRATE, RIBOFLAVIN, NIACINAMIDE, PYRIDOXINE HYDROCHLORIDE, FOLIC ACID, CYANOCOBALAMIN, CALCIUM CARBONATE, FERROUS FUMARATE, ZINC OXIDE AND CUPRIC OXIDE 2000; 2000; 120; 400; 22; 1.84; 3; 20; 10; 1; 12; 200; 27; 25; 2 [IU]/1; [IU]/1; MG/1; [IU]/1; MG/1; MG/1; MG/1; MG/1; MG/1; MG/1; UG/1; MG/1; MG/1; MG/1; MG/1
1 TABLET ORAL
Refills: 0 | DISCHARGE

## 2024-12-18 RX ORDER — DIBUCAINE 1 %
1 OINTMENT (GRAM) TOPICAL
Qty: 0 | Refills: 0 | DISCHARGE
Start: 2024-12-18

## 2024-12-18 RX ORDER — PRAMOXINE HYDROCHLORIDE 1 MG/ML
1 LOTION TOPICAL EVERY 4 HOURS
Refills: 0 | Status: DISCONTINUED | OUTPATIENT
Start: 2024-12-18 | End: 2024-12-19

## 2024-12-18 RX ORDER — KETOROLAC TROMETHAMINE 30 MG/ML
30 INJECTION INTRAMUSCULAR; INTRAVENOUS ONCE
Refills: 0 | Status: DISCONTINUED | OUTPATIENT
Start: 2024-12-18 | End: 2024-12-18

## 2024-12-18 RX ORDER — HYDROCORTISONE BUTYRATE 0.1 %
1 CREAM (GRAM) TOPICAL EVERY 6 HOURS
Refills: 0 | Status: DISCONTINUED | OUTPATIENT
Start: 2024-12-18 | End: 2024-12-19

## 2024-12-18 RX ORDER — ESCITALOPRAM OXALATE 10 MG/1
1 TABLET, FILM COATED ORAL
Refills: 0 | DISCHARGE

## 2024-12-18 RX ORDER — ACETAMINOPHEN 500MG 500 MG/1
975 TABLET, COATED ORAL EVERY 6 HOURS
Refills: 0 | Status: DISCONTINUED | OUTPATIENT
Start: 2024-12-18 | End: 2024-12-18

## 2024-12-18 RX ORDER — DIPHENHYDRAMINE HCL 25 MG
25 CAPSULE ORAL EVERY 6 HOURS
Refills: 0 | Status: DISCONTINUED | OUTPATIENT
Start: 2024-12-18 | End: 2024-12-18

## 2024-12-18 RX ORDER — TETANUS TOXOID, REDUCED DIPHTHERIA TOXOID AND ACELLULAR PERTUSSIS VACCINE, ADSORBED 5; 2.5; 8; 8; 2.5 [IU]/.5ML; [IU]/.5ML; UG/.5ML; UG/.5ML; UG/.5ML
0.5 SUSPENSION INTRAMUSCULAR ONCE
Refills: 0 | Status: DISCONTINUED | OUTPATIENT
Start: 2024-12-18 | End: 2024-12-19

## 2024-12-18 RX ORDER — .BETA.-CAROTENE, SODIUM ACETATE, ASCORBIC ACID, CHOLECALCIFEROL, .ALPHA.-TOCOPHEROL ACETATE, DL-, THIAMINE MONONITRATE, RIBOFLAVIN, NIACINAMIDE, PYRIDOXINE HYDROCHLORIDE, FOLIC ACID, CYANOCOBALAMIN, CALCIUM CARBONATE, FERROUS FUMARATE, ZINC OXIDE AND CUPRIC OXIDE 2000; 2000; 120; 400; 22; 1.84; 3; 20; 10; 1; 12; 200; 27; 25; 2 [IU]/1; [IU]/1; MG/1; [IU]/1; MG/1; MG/1; MG/1; MG/1; MG/1; MG/1; UG/1; MG/1; MG/1; MG/1; MG/1
1 TABLET ORAL DAILY
Refills: 0 | Status: DISCONTINUED | OUTPATIENT
Start: 2024-12-18 | End: 2024-12-18

## 2024-12-18 RX ORDER — WITCH HAZEL 50 G/100ML
1 SOLUTION RECTAL
Qty: 0 | Refills: 0 | DISCHARGE
Start: 2024-12-18

## 2024-12-18 RX ORDER — SIMETHICONE 125 MG
80 CAPSULE ORAL EVERY 4 HOURS
Refills: 0 | Status: DISCONTINUED | OUTPATIENT
Start: 2024-12-18 | End: 2024-12-18

## 2024-12-18 RX ORDER — SENNOSIDES 8.6 MG
2 TABLET ORAL AT BEDTIME
Refills: 0 | Status: DISCONTINUED | OUTPATIENT
Start: 2024-12-18 | End: 2024-12-19

## 2024-12-18 RX ORDER — DIPHENHYDRAMINE HCL 25 MG
25 CAPSULE ORAL EVERY 6 HOURS
Refills: 0 | Status: DISCONTINUED | OUTPATIENT
Start: 2024-12-18 | End: 2024-12-19

## 2024-12-18 RX ORDER — HYDROCORTISONE BUTYRATE 0.1 %
1 CREAM (GRAM) TOPICAL EVERY 6 HOURS
Refills: 0 | Status: DISCONTINUED | OUTPATIENT
Start: 2024-12-18 | End: 2024-12-18

## 2024-12-18 RX ORDER — PRAMOXINE HYDROCHLORIDE 1 MG/ML
1 LOTION TOPICAL EVERY 4 HOURS
Refills: 0 | Status: DISCONTINUED | OUTPATIENT
Start: 2024-12-18 | End: 2024-12-18

## 2024-12-18 RX ORDER — WITCH HAZEL 50 G/100ML
1 SOLUTION RECTAL EVERY 4 HOURS
Refills: 0 | Status: DISCONTINUED | OUTPATIENT
Start: 2024-12-18 | End: 2024-12-19

## 2024-12-18 RX ORDER — SODIUM CHLORIDE 9 MG/ML
3 INJECTION, SOLUTION INTRAMUSCULAR; INTRAVENOUS; SUBCUTANEOUS EVERY 8 HOURS
Refills: 0 | Status: DISCONTINUED | OUTPATIENT
Start: 2024-12-18 | End: 2024-12-18

## 2024-12-18 RX ADMIN — Medication 2 TABLET(S): at 22:34

## 2024-12-18 RX ADMIN — ACETAMINOPHEN 500MG 975 MILLIGRAM(S): 500 TABLET, COATED ORAL at 18:50

## 2024-12-18 RX ADMIN — ACETAMINOPHEN 500MG 975 MILLIGRAM(S): 500 TABLET, COATED ORAL at 22:45

## 2024-12-18 RX ADMIN — Medication 600 MILLIGRAM(S): at 20:10

## 2024-12-18 RX ADMIN — SODIUM CHLORIDE 3 MILLILITER(S): 9 INJECTION, SOLUTION INTRAMUSCULAR; INTRAVENOUS; SUBCUTANEOUS at 22:04

## 2024-12-18 RX ADMIN — KETOROLAC TROMETHAMINE 30 MILLIGRAM(S): 30 INJECTION INTRAMUSCULAR; INTRAVENOUS at 15:32

## 2024-12-18 RX ADMIN — KETOROLAC TROMETHAMINE 30 MILLIGRAM(S): 30 INJECTION INTRAMUSCULAR; INTRAVENOUS at 15:02

## 2024-12-18 RX ADMIN — ESCITALOPRAM OXALATE 10 MILLIGRAM(S): 10 TABLET, FILM COATED ORAL at 17:57

## 2024-12-18 RX ADMIN — Medication 167 MILLIUNIT(S)/MIN: at 13:37

## 2024-12-18 RX ADMIN — ACETAMINOPHEN 500MG 975 MILLIGRAM(S): 500 TABLET, COATED ORAL at 17:57

## 2024-12-18 RX ADMIN — Medication 600 MILLIGRAM(S): at 19:30

## 2024-12-18 RX ADMIN — ACETAMINOPHEN 500MG 975 MILLIGRAM(S): 500 TABLET, COATED ORAL at 22:00

## 2024-12-18 NOTE — OB RN DELIVERY SUMMARY - NS_GENERALBABYACOMMENTA_OBGYN_ALL_OB_FT
Prior Authorization Form:      DEMOGRAPHICS:                     Patient Name:  Butch Lockett  Patient :  1972            Insurance:  Payor: [employer] / Plan: [employer] - [place] PPO / Product Type: *No Product type* /   Insurance ID Number:    Payer/Plan Subscr  Sex Relation Sub. Ins. ID Effective Group Num   1.  [address] [facility] 1972 Female Self [id number]                                    [id number]         DIAGNOSIS & PROCEDURE:                       Procedure/Operation: sigmoidoscopy           CPT Code: 22954    Diagnosis:  colon polyps    ICD10 Code: z86.010    Location:  [place]    Surgeon:  [facility] INFORMATION:                          Date: 10-3-2022    Time: 9:00              Anesthesia:  MAC/TIVA                                                       Status:  Outpatient        Special Comments:         Electronically signed by Janelle Almonte MA on 2022 at 12:47 PM
STS delayed due to CPAP. CAT II tracing

## 2024-12-18 NOTE — DISCHARGE NOTE OB - MEDICATION SUMMARY - MEDICATIONS TO TAKE
I will START or STAY ON the medications listed below when I get home from the hospital:    ibuprofen 600 mg oral tablet  -- 1 tab(s) by mouth every 6 hours as needed for  moderate pain  -- Indication: For pain    acetaminophen 325 mg oral tablet  -- 3 tab(s) by mouth every 6 hours as needed for  moderate pain  -- Indication: For pain    dibucaine 1% topical ointment  -- 1 Apply on skin to affected area every 6 hours As needed Perineal discomfort  -- Indication: For vaginal pain    witch hazel 50% topical pad  -- 1 Apply on skin to affected area every 4 hours As needed Perineal discomfort  -- Indication: For vaginal pain   I will START or STAY ON the medications listed below when I get home from the hospital:    ibuprofen 600 mg oral tablet  -- 1 tab(s) by mouth every 6 hours as needed for  moderate pain  -- Indication: For pain    acetaminophen 325 mg oral tablet  -- 3 tab(s) by mouth every 6 hours as needed for  moderate pain  -- Indication: For pain    dibucaine 1% topical ointment  -- 1 Apply on skin to affected area every 6 hours As needed Perineal discomfort  -- Indication: For vaginal pain    witch hazel 50% topical pad  -- 1 Apply on skin to affected area every 4 hours As needed Perineal discomfort  -- Indication: For vaginal pain    ferrous sulfate 325 mg (65 mg elemental iron) oral tablet  -- 1 tab(s) by mouth once a day  -- Indication: For Anemia    ascorbic acid 500 mg oral tablet  -- 1 tab(s) by mouth once a day  -- Indication: For nutrition

## 2024-12-18 NOTE — OB PROVIDER DELIVERY SUMMARY - NS_BEFORE39WEEKS_OBGYN_ALL_OB
No [Insomnia] : insomnia [Fever] : no fever [Chills] : no chills [Pain] : no pain [Vision Problems] : no vision problems [Nasal Discharge] : no nasal discharge [Sore Throat] : no sore throat [Chest Pain] : no chest pain [Palpitations] : no palpitations [Shortness Of Breath] : no shortness of breath [Cough] : no cough [Dyspnea on Exertion] : not dyspnea on exertion [Abdominal Pain] : no abdominal pain [Constipation] : no constipation [Diarrhea] : no diarrhea [Melena] : no melena [Dysuria] : no dysuria [Hematuria] : no hematuria [Frequency] : no frequency [Muscle Pain] : no muscle pain [Muscle Weakness] : no muscle weakness [Itching] : no itching [Skin Rash] : no skin rash [Headache] : no headache [Dizziness] : no dizziness [Depression] : no depression [Easy Bleeding] : no easy bleeding [Easy Bruising] : no easy bruising

## 2024-12-18 NOTE — OB PROVIDER DELIVERY SUMMARY - NSPROVIDERDELIVERYNOTE_OBGYN_ALL_OB_FT
cat 2 tracing-peds present at delivery    Patient found to be fully dilated and directed to push.  Spontaneous vaginal delivery of viable liveborn female infant.   Head, shoulders, and body delivered without complications.  Infant was suctioned and passed to mother.   Delayed cord clamping performed, then clamped and cut.   Placenta delivered intact with a 3-vessel cord.   Fundal massage was given and uterine fundus was noted to be firm.   Vaginal exam revealed an intact cervix, vaginal walls, and sulci.   Patient has a 2nd degree laceration that was repaired with 2-0 chromic suture.   Excellent hemostasis noted.  Patient was stable and started postpartum recovery in room.   Count was correct x 2.     Infant: female  Apgar: 8/8  QBL: 347ml

## 2024-12-18 NOTE — DISCHARGE NOTE OB - FINANCIAL ASSISTANCE
[facility] provides services at a reduced cost to those who are determined to be eligible through [facility]’s financial assistance program. Information regarding [facility]’s financial assistance program can be found by going to [email or web address] or by calling [phone].

## 2024-12-18 NOTE — OB RN DELIVERY SUMMARY - NSSELHIDDEN_OBGYN_ALL_OB_FT
[NS_DeliveryAttending1_OBGYN_ALL_OB_FT:[id number]],[NS_DeliveryAssist1_OBGYN_ALL_OB_FT:[id number]],[NS_DeliveryRN_OBGYN_ALL_OB_FT:[id number]]

## 2024-12-18 NOTE — OB RN DELIVERY SUMMARY - NS_DELIVERYATTENDING1_OBGYN_ALL_OB_FT
Your Child's Health     2 ½ Year Old Visit     Luz Del Castillo MD Nevaeana Brian  April 28, 2023    Visit Vitals  Ht 3' 1.5\" (0.953 m)   Wt 13.3 kg (29 lb 6.4 oz)   HC 49 cm (19.29\")   BMI 14.70 kg/m²     Weight:     NUTRITION: Obesity and being overweight are serious problems in the [place]. Parents are the greatest influence on a child’s development, including establishing healthy eating habits.     Family meals provide an opportunity for parents to model healthy eating behaviors for their children in a pleasant environment. At age 2 ½, your child grows less than they did in the first year of life. This leads to less interest in food. Food refusals, food jags (liking a food one day and not the next) and picky eating are normal.    To help your child establish healthy eating habits, offer your child a variety of nutritious foods at scheduled meal times (breakfast, lunch and dinner) and snack time (1 - 2 daily).    Remember, serving sizes are small for your child at this age.  For example, a serving of vegetables is 1 - 2 tablespoons. It can take up to 12 - 15 times, over several months, of presenting a food before a child will eat it. As parents, we can praise our toddler for trying bites of new foods and ignore protests about food they do not like. Do not encourage your children to eat if they tell you they are full.  Healthy children will not starve themselves. Do not reward your children for cleaning their plates. If they always leave food, reduce the size of the portions and tell them to ask for more if they are still hungry.    Examples of snacks include fresh fruit, vegetables, and plain low fat greek yogurt with fresh fruit. Be creative at snack time - use fruits and vegetables to make up a story.  For example, broccoli can be a tree.     Water and low fat milk are the best drink choices for your child.  At this age your child should be drinking milk that is 1 or 2% skim milk.  Toddlers need about 3 servings of milk or milk products each day (1 serving for this age = 1/2 cup or 120 ml).        Skip the juice and stick with water. Toddlers never need soda or sports drinks. Even 100% juice has a significant amount of sugar that can damage teeth.  Eat fruit instead of drinking juice. Give no more than 1 cup per week of 100% juice, soda, fruit drinks or sweetened chocolate milk. This is the latest recommendation from the American Heart Association.     Because it is difficult (for all ages!) to meet the recommended amount of vitamin D (600 IU daily) by eating or drinking the right foods, a daily multivitamin with iron supplement (or a pure vitamin D supplement) is recommended.     DENTAL CARE:  All children should brush their teeth twice a day. Use a pea-sized lump of fluoride toothpaste and a soft toothbrush. If Dinah swallows a little of the toothpaste, it will cause no harm; however, you should not let her eat paste from the tube. Excess fluoride can cause spotted teeth.    SLEEP:  Your toddler still needs quite a bit of night sleep as well as a nap in the daytime. Children this age is typically sleeping 11 - 14 hours out of their 24-hour day.  When your child doesn't sleep enough, they will be grumpier and less likely to follow instructions.     Some children begin a habit of joining their parents in bed in the middle of the night.  Here are some tips to help avoid this:  Put you toddler in their own bed to fall asleep.    Leave the room, telling them that you'll see them when the sun comes up. Children who fall asleep with their parents besides them are more likely to need their parents in the middle of the night.  When your child wakes in the night and comes to your bedroom, immediately walk them back to their room without a word spoken. Tuck them in and then return to your bedroom. Repeat if child returns.    Nightmares occur later in sleep. Children awake fearful or in tears, and may  have a hard time falling back asleep. Reassure the child that you are there, and they are safe.  Remind your child that dreams are not real, and encourage them to go back to sleep when ready.    Night terrors occur earlier in sleep. Night terrors are a part of sleep. They are not truly awake and do not remember the event. Stay calm and do not try to wake your child.  Waking them may make them more upset. Hold your child in your arms and then gently talk them back into sleep.     DEVELOPMENT:  From about 2 ½ years to five years, children are still very active, but they have learned enough language and enough of the thinking that goes along with language to be able to join in with words as well as action. With a little help your child can now wash their face, put on their shoes, and get a drink of water from a faucet.      When your child is speaking, listen attentively, and at times repeat what they say to clarify or to emphasize that they are heard.     Consistent family routines, including regular playtime together and clear limits, help a child develop a sense of security and self-control. It takes the interaction between an adult and a child to help your child's mind grown. Unexplained changes in physical environment (house moves, changes in , even vacations) and unexpected events can cause fear in a child this age. When they are afraid, try to have your child explain their fear to you or try to draw it. Listen carefully as they explain it. Then talk to them about practicing their bravery.      Children this age should feel comfortable playing [id number] with other children. Free play time (without TV or screens (computer, Ipad, etc.)) comes first. Limit TV and screen time to no more than 1 hour a day. Read books together every day. Listen to Dinah and read to her often. This will help with language development.  Children who have been read to have higher grades all the way through school.    A 2 ½  year old child can most often wash and dry his or her hands, help with simple tasks, and put on some clothes. A rapidly increasing vocabulary generally exists along with only a 5-10-minute attention span. The short attention span allows you to distract Dinah out of impending tantrums. 2 ½ year old children tend to walk into things, to run too fast, and to have no fear of heights or danger.    INDEPENDENCE:  The more independent a child can become; the more self-esteem will result. Never do for a child what the child is capable of doing alone. It may take you longer to do a job with \"help\" from Dinah, but otherwise she may grow up not knowing how to cook or clean. You might think that Dinah would be grateful for your cooking and cleaning, but if you do everything for her, she will come to regard you as a servant and lose respect for you. Help Dinah learn to dress and play and do simple household tasks. At this age, she may actually enjoy doing these things.    TELEVISION:  Be careful what your child may see when you are watching television.  Scary adult content can lead to sleep and behavior problems. Limit television and video to wholesome programs, and permit no more than two hours of viewing per day.  Excess television viewing is related to overweight problems in children. Limit TV, videos, and screen time (computer, Ipad, etc.) to no more than 1 hour each day. Expect Dinah to be upset if the children in the TV show she is watching are upset. Children cannot distinguish commercials from the program's content until they are 6-8 years old. They are very susceptible to advertising, and TV will increase their demands on you for toys and junk food. Read books together every day instead.  Reading aloud will help your child get ready for .     TOILET TRAINING:  Please keep in mind the following factors:  Most children are physically ready to begin toilet training at 18 months.  The average age for girls in  the [place] to be trained is almost 3 years; boys are generally trained when they are a little older.  Toilet training should be a positive experience if possible.  If Dinah is resistant at the first attempt, it is best to forget about if for a few months and then try again.  A child too small for the toilet seat may need a potty chair or may need to grow a little before beginning on the adult toilet.  Always praise your child’s attempts and successes; never punish or blame them for accidents or lack of interest. Read books, keep them in easy-to-remove clothing, have a regular routine of potty-time every 1 to 2 hours when you are home with them, also, have special books or games reserved to use only while they are on the potty.    DISCIPLINE:  Be consistent with discipline.  Think of it as teaching where repetition is necessary for learning.  If disrespectful language and hitting are difficult now, think of how hard it will be if you allow Dinah to go untrained until the teens.  Dinah craves your attention, so give her more attention for good behavior and less attention (for example, time-outs) for bad behavior.  You can give a time-out to a 2 ½ year old by simply not looking at or talking to the child for 2-5 minutes, although a \"naughty chair\" also works very well for some children.    ACCIDENT PREVENTION:  Car Safety:  Car accidents are one of the greatest danger to your child's life and health.  USE a car safety seat EVERY TIME your child is in the car.  An approved car safety seat in the back seat of the car is required by [place] law until Dinah is 8 years old and weighs at least 80 pounds. Children are safest when they are facing backwards until they outgrow the size limits for the rear-facing position of the car safety seat.  They should stay in a 5-point-harness car safety seat until they outgrow it per car safety seat recommendations.  Never leave a child alone inside a car.  Please consider using one  of the many free programs that check to make sure the seat is properly installed..   Pets:  2 ½ year old children do not know how to treat pets.  This is the most common age for dog bites.  Children need to be taught not to hand-feed dogs and not to play with them when the dog is eating or sleeping.  Water safety:  Keep your child within arm's reach around water.  Never leave your child alone in or near a bathtub, pail of water, wading or swimming pool, or any other water - even for a moment.  Empty buckets, play pools, and tubs right after use.   Sun safety: When your child is going to be outside, always use a “broad spectrum” sunscreen (which protects against both UVA and UVB rays) with an SPF of at least 30; apply it at least 20 to 30 minutes before they go out. Try to avoid extended time in the sun between 11 am and 3 pm. Protective clothing as well as hats and sunglasses help protect against sunburn and skin damage.   Fires and Burns: Toddlers are fascinated by fires; watch them very carefully around grills, stoves, firepits and campfires. Make sure matches and lighters are safely stored out of reach and continue to keep hot items out of reach. Have a family fire safety plan, including but not limited to, regular smoke detector (and carbon monoxide detector) battery checks, working fire extinguishers and escape routes.    SMOKING:  Children exposed to tobacco smoke have more ear infections, pneumonia and cold symptoms last longer. If you smoke, please quit. If you cannot quit, smoke outside. Do not smoke near Dinah, and do not let others smoke near her.    LEAD EXPOSURE:  If there is lead in the house, Dinah may be vulnerable. Let us know if any of the following apply:  Your home was built before 1960 and has peeling or chipping or chalking paint.  Homes built in older cities at the turn of the last century may have lead pipes.  Check to see if any of the above applies to Dinah's sitter's home, ,  , or any other house where Dinah spends time.  You have another child or housemate who is being followed or treated for an elevated lead level.  Dinah lives with an adult whose job or hobby involves lead exposure (soldering, battery manufacture, recycling, casting, stained glass, etc.).  You live near an active lead smelter, a battery recycling plant, or a plant that processes hot metal.    MEDICATION FOR FEVER OR PAIN:   Acetaminophen liquid (e.g., Tylenol or Tempra) may be given every four hours as needed for pain or fever.     CHILDREN’S Tylenol/Acetaminophen  (160 MG/5 mL)    Child’s Weight: Dose:  24 - 35 pounds:   160 mg (5.0 mL (1 Teaspoon))  36 - 47 pounds:   240 mg (7.5 mL (1 1/2 Teaspoons))    CHILDREN’S Tylenol/Acetaminophen MELTAWAYS (80 MG tablets)    Child’s Weight:  Dose:  24 - 35 pounds:    160 mg (2 meltaway tablets)  36 - 47 pounds:    240 mg (3 meltaway tablets)    GILMA (Jr) Tylenol/Acetaminophen MELTAWAYS (160 MG tablets)    Child’s Weight:  Dose:  24 - 34 pounds:  160 mg (1 meltaway tablet)  36 - 47 pounds:    240 mg (1 1/2 meltaway tablets)    CHILDREN'S Ibuprofen liquid (100MG/5mL) (e.g., Advil or Motrin) may be given every six hours as needed for pain or fever. Please check the concentration of your ibuprofen to be sure you are giving the correct amount.      Child’s Weight:  Dose:  24 - 35 pounds:    100 mg (5.0 mL (1 Teaspoon))  36 - 47 pounds:    150 mg (7.5 mL (1 1/2 Teaspoons))        NEXT VISIT:  1 YEARS OF AGE    Milestone to be working towards:   Jumps well in place, stands on one foot, pedals a tricycle   Scribbles and copies a [place], uses utensils, puts on some clothing   Knows name, age, gender, colors, shapes.   Speech 75% understandable, in 3-4 word sentences   Undresses, helps dress, self feeding     Thank you for entrusting your care to [facility].   Vern Rich MD

## 2024-12-18 NOTE — DISCHARGE NOTE OB - CARE PROVIDER_API CALL
Vern Rich  Obstetrics and Gynecology  [address] [place] [address], [address]  [place] [address]  Phone: [phone]  Fax: [phone]  Follow Up Time:

## 2024-12-18 NOTE — OB NEONATOLOGY/PEDIATRICIAN DELIVERY SUMMARY - NSPEDSNEONOTESA_OBGYN_ALL_OB_FT
Peds called to delivery for category II tracing. 39.1 wk AGA female born via  after IOL to a 30 y/o  mother. Mother admitted for IOL.  Maternal medical/surgical hx of depression, anxiety (taking Lexapro 10mg), and anemia. Maternal ob/gyn hx of  in  and HPV+. Maternal labs include Blood Type AB+, HIV -, RPR NR, Rubella I, Hep B -, GBS unknown (did not receive abx). ROM at 08:15 on  with clear fluids (ROM hours: 4H47M). Category 2 tracing. Baby emerged vigorous, crying, was w/d/s/s with APGARS of 8/8. Patient had pallor and poor oxygen saturation (70s) after 5 MOL. CPAP was given from 8 MOL to 13 MOL and had improvement in her oxygenation and color. Resuscitation included: CPAP, suctioning, drying, chest PT, repositioning. Mom plans to initiate breastfeeding and consents to Hep B vaccine.  Highest maternal temp: 37.1. EOS 0.13. Admitted to NBN.    :   TOB: 13:02  BW: 3620g    Physical Exam:  Gen: NAD; well-appearing  HEENT: NC/AT; AFOF; ears and nose clinically patent, normally set; no tags ; no cleft lip/palate, oropharynx clear  Skin: pink, warm, well-perfused, no rash  Resp: CTAB, even, non-labored breathing  Cardiac: RRR, normal S1/S2; no murmurs; 2+ femoral pulses b/l  Abd: soft, NT/ND; +BS; no HSM, no masses palpated; umbilicus c/d/I, 3 vessels  Back: spine straight, no dimples or bennett  Extremities: FROM; no crepitus; negative O/B  : Tong I; no abnormalities; no hernia; anus patent  Neuro: normal tone; + Benjamin, suck, grasp, Babinski

## 2024-12-18 NOTE — OB PROVIDER DELIVERY SUMMARY - NSSELHIDDEN_OBGYN_ALL_OB_FT
[NS_DeliveryAttending1_OBGYN_ALL_OB_FT:[id number]],[NS_DeliveryAssist1_OBGYN_ALL_OB_FT:[id number]]

## 2024-12-18 NOTE — OB NEONATOLOGY/PEDIATRICIAN DELIVERY SUMMARY - BABY A: APGAR 5 MIN HEART RATE, DELIVERY
(2) more than 100 beats/min
I, Sebastian Coleman, performed the initial face to face bedside interview with this patient regarding history of present illness, review of symptoms and relevant past medical, social and family history.  I completed an independent physical examination.  I was the provider who initially evaluated this patient.  The history, relevant review of systems, past medical and surgical history, medical decision making, and physical examination was documented by the scribe in my presence and I attest to the accuracy of the documentation. Follow-up on ordered tests (ie labs, radiologic studies) and re-evaluation of the patient's status has been communicated to the ACP.  Disposition of the patient will be based on test outcome and response to ED interventions.

## 2024-12-18 NOTE — DISCHARGE NOTE OB - PATIENT PORTAL LINK FT
You can access the FollowMyHealth Patient Portal offered by [facility] by registering at the following website: [email or web address]. By joining [facility]’s FollowMyHealth portal, you will also be able to view your health information using other applications (apps) compatible with our system.

## 2024-12-18 NOTE — OB PROVIDER LABOR PROGRESS NOTE - ASSESSMENT
CNM OB Progress Note    Patient seen and evaluated at bedside.  starting to feel contractions now.  Comfortable w/ anesthesia epidural.      T(C): 36.2 (12-18-24 @ 08:18), Max: 37.1 (12-17-24 @ 18:01)  HR: 69 (12-18-24 @ 08:22) (59 - 92)  BP: 91/59 (12-18-24 @ 08:21) (80/51 - 134/97)  RR: 15 (12-18-24 @ 08:18) (15 - 20)  SpO2: 96% (12-18-24 @ 08:22) (89% - 100%)    SVE: 3.5/80/-2    -Labor: cat 1 tracing  -GBS neg  -Analgesia: anesthesia epidural  -Induction with: IV Pitocin    Pitocin at 4u, continue titrating for cat 1 tracing  Reposition PRN  consider peanutball use PRN  AROM on exam, moderate amount of clear fluids, head well applied to cervix    -Continue to monitor with EFM & TOCO  -Recheck patient in 2-4 hours or PRN    Discussed with MD Rich    CNDOROTHEA Rae  
    patient found to be fully dilated  VE 10/100/-1    cat 2 tracing>>prolonged deceleration noted post exam and trial of push  trial of push performed, no fetal head descend noted  Pitocin at 4u>>paused, then decreased to 2u  repositioned to left and right lateral position    MD Rich at bedside  FHR working its way to baseline to 140s, repositioned on right lateral position  Pitocin to remain at 2u now  consider using peanutball in 15 minutes  anticipate       Recheck in 1-2hours or PRN  Continue to monitor with EFM/[place]      Discussed with MD Layla Rae    
 eIOL    -Labor: sp BC x1, on pitocin, making cervical change. Plan for epidural followed by AROM  -Fetus: cat 1  -GBS: neg  -Pain: will contact anesthesia for epidural    D/w Dr. Rio Trent PGY3

## 2024-12-18 NOTE — OB RN DELIVERY SUMMARY - NS_SEPSISRSKCALC_OBGYN_ALL_OB_FT
EOS calculated successfully. EOS Risk Factor: 0.5/1000 live births ([facility] national incidence); GA=39w1d; Temp=99.14; ROM=4.783; GBS='Unknown'; Antibiotics='No antibiotics or any antibiotics < 2 hrs prior to birth'

## 2024-12-18 NOTE — OB PROVIDER LABOR PROGRESS NOTE - NS_SUBJECTIVE/OBJECTIVE_OBGYN_ALL_OB_FT
Patient seen at the bedside for VE
Patient seen at the bedside for VE
Pt seen and examined at bedside.    Vital Signs Last 24 Hrs  T(C): 36.7 (18 Dec 2024 04:33), Max: 37.1 (17 Dec 2024 18:01)  T(F): 98.1 (18 Dec 2024 04:33), Max: 98.78 (17 Dec 2024 18:08)  HR: 67 (18 Dec 2024 05:33) (62 - 92)  BP: 111/67 (18 Dec 2024 00:59) (111/67 - 120/80)  BP(mean): --  RR: 20 (17 Dec 2024 22:23) (18 - 20)  SpO2: 100% (18 Dec 2024 05:38) (96% - 100%)    Parameters below as of 17 Dec 2024 18:01  Patient On (Oxygen Delivery Method): room air

## 2024-12-18 NOTE — OB RN DELIVERY SUMMARY - NS_PROPHYLABX_OBGYN_ALL_OB
Informed pharmacy  
Sorry should be just 1 bid   
You sent in rx for requip, but directions say take one bid and 1 hour before bedtime. Need clarification.  
N/A

## 2024-12-18 NOTE — DISCHARGE NOTE OB - MATERIALS PROVIDED
Vaccinations/[facility]  Screening Program/  Immunization Record/Breastfeeding Log/Bottle Feeding Log/Breastfeeding Mother’s Support Group Information/Guide to Postpartum Care/Back To Sleep Handout/Shaken Baby Prevention Handout/Breastfeeding Guide and Packet/Birth Certificate Instructions/Tdap Vaccination (VIS Pub Date: 2012)

## 2024-12-19 VITALS
OXYGEN SATURATION: 98 % | SYSTOLIC BLOOD PRESSURE: 119 MMHG | HEART RATE: 76 BPM | DIASTOLIC BLOOD PRESSURE: 75 MMHG | RESPIRATION RATE: 18 BRPM | TEMPERATURE: 98 F

## 2024-12-19 LAB
HCT VFR BLD CALC: 28.1 % — LOW (ref 34.5–45)
HGB BLD-MCNC: 9 G/DL — LOW (ref 11.5–15.5)

## 2024-12-19 RX ORDER — FERROUS SULFATE 325(65) MG
325 TABLET ORAL DAILY
Refills: 0 | Status: DISCONTINUED | OUTPATIENT
Start: 2024-12-19 | End: 2024-12-19

## 2024-12-19 RX ORDER — MULTIVIT WITH MINERALS/LUTEIN
500 TABLET ORAL DAILY
Refills: 0 | Status: DISCONTINUED | OUTPATIENT
Start: 2024-12-19 | End: 2024-12-19

## 2024-12-19 RX ORDER — FERROUS SULFATE 325(65) MG
1 TABLET ORAL
Qty: 0 | Refills: 0 | DISCHARGE
Start: 2024-12-19

## 2024-12-19 RX ORDER — MULTIVIT WITH MINERALS/LUTEIN
1 TABLET ORAL
Qty: 0 | Refills: 0 | DISCHARGE
Start: 2024-12-19

## 2024-12-19 RX ADMIN — ACETAMINOPHEN 500MG 975 MILLIGRAM(S): 500 TABLET, COATED ORAL at 14:04

## 2024-12-19 RX ADMIN — Medication 600 MILLIGRAM(S): at 06:45

## 2024-12-19 RX ADMIN — .BETA.-CAROTENE, SODIUM ACETATE, ASCORBIC ACID, CHOLECALCIFEROL, .ALPHA.-TOCOPHEROL ACETATE, DL-, THIAMINE MONONITRATE, RIBOFLAVIN, NIACINAMIDE, PYRIDOXINE HYDROCHLORIDE, FOLIC ACID, CYANOCOBALAMIN, CALCIUM CARBONATE, FERROUS FUMARATE, ZINC OXIDE AND CUPRIC OXIDE 1 TABLET(S): 2000; 2000; 120; 400; 22; 1.84; 3; 20; 10; 1; 12; 200; 27; 25; 2 TABLET ORAL at 11:18

## 2024-12-19 RX ADMIN — Medication 600 MILLIGRAM(S): at 00:00

## 2024-12-19 RX ADMIN — Medication 600 MILLIGRAM(S): at 06:00

## 2024-12-19 RX ADMIN — Medication 600 MILLIGRAM(S): at 11:19

## 2024-12-19 RX ADMIN — ACETAMINOPHEN 500MG 975 MILLIGRAM(S): 500 TABLET, COATED ORAL at 15:00

## 2024-12-19 RX ADMIN — SODIUM CHLORIDE 3 MILLILITER(S): 9 INJECTION, SOLUTION INTRAMUSCULAR; INTRAVENOUS; SUBCUTANEOUS at 06:00

## 2024-12-19 RX ADMIN — Medication 600 MILLIGRAM(S): at 12:08

## 2024-12-19 RX ADMIN — ACETAMINOPHEN 500MG 975 MILLIGRAM(S): 500 TABLET, COATED ORAL at 09:00

## 2024-12-19 RX ADMIN — Medication 600 MILLIGRAM(S): at 00:45

## 2024-12-19 RX ADMIN — Medication 500 MILLIGRAM(S): at 11:19

## 2024-12-19 RX ADMIN — ACETAMINOPHEN 500MG 975 MILLIGRAM(S): 500 TABLET, COATED ORAL at 08:12

## 2024-12-19 RX ADMIN — ACETAMINOPHEN 500MG 975 MILLIGRAM(S): 500 TABLET, COATED ORAL at 03:01

## 2024-12-19 RX ADMIN — ACETAMINOPHEN 500MG 975 MILLIGRAM(S): 500 TABLET, COATED ORAL at 03:45

## 2024-12-19 NOTE — PROGRESS NOTE ADULT - SUBJECTIVE AND OBJECTIVE BOX
Post-partum Note,   She is a  29y woman who is now post-partum day: 1    Subjective:  The patient feels well.  She is ambulating.   She is tolerating regular diet.  She denies nausea and vomiting; denies fever.  She is voiding.  Her pain is controlled.  She reports normal postpartum bleeding.      Physical exam:    Vital Signs Last 24 Hrs  T(C): 36.8 (19 Dec 2024 05:57), Max: 37.3 (18 Dec 2024 12:55)  T(F): 98.2 (19 Dec 2024 05:57), Max: 99.14 (18 Dec 2024 12:55)  HR: 84 (19 Dec 2024 05:57) (72 - 92)  BP: 109/67 (19 Dec 2024 05:57) (95/62 - 119/76)  BP(mean): --  RR: 17 (19 Dec 2024 05:57) (17 - 18)  SpO2: 100% (19 Dec 2024 05:57) (96% - 100%)    Parameters below as of 18 Dec 2024 18:16  Patient On (Oxygen Delivery Method): room air        Gen: NAD  Breast: Soft, nontender, not engorged.  Abdomen: Soft, nontender, no distension , firm uterine fundus at umbilicus.  Pelvic: Normal lochia noted  Ext: No calf tenderness    LABS:                        9.0    x     )-----------( x        ( 19 Dec 2024 05:48 )             28.1       Rubella status:     Allergies    No Known Allergies    Intolerances      MEDICATIONS  (STANDING):  acetaminophen     Tablet .. 975 milliGRAM(s) Oral <User Schedule>  ascorbic acid 500 milliGRAM(s) Oral daily  diphtheria/tetanus/pertussis (acellular) Vaccine (Adacel) 0.5 milliLiter(s) IntraMuscular once  escitalopram 10 milliGRAM(s) Oral daily  ferrous    sulfate 325 milliGRAM(s) Oral daily  ibuprofen  Tablet. 600 milliGRAM(s) Oral every 6 hours  prenatal multivitamin 1 Tablet(s) Oral daily  senna 2 Tablet(s) Oral at bedtime  sodium chloride 0.9% lock flush 3 milliLiter(s) IV Push every 8 hours    MEDICATIONS  (PRN):  benzocaine 20%/menthol 0.5% Spray 1 Spray(s) Topical every 6 hours PRN for Perineal discomfort  dibucaine 1% Ointment 1 Application(s) Topical every 6 hours PRN Perineal discomfort  diphenhydrAMINE 25 milliGRAM(s) Oral every 6 hours PRN Pruritus  hydrocortisone 1% Cream 1 Application(s) Topical every 6 hours PRN Moderate Pain (4-6)  lanolin Ointment 1 Application(s) Topical every 6 hours PRN nipple soreness  magnesium hydroxide Suspension 30 milliLiter(s) Oral two times a day PRN Constipation  oxyCODONE    IR 5 milliGRAM(s) Oral every 3 hours PRN Moderate to Severe Pain (4-10)  oxyCODONE    IR 5 milliGRAM(s) Oral once PRN Moderate to Severe Pain (4-10)  pramoxine 1%/zinc 5% Cream 1 Application(s) Topical every 4 hours PRN Moderate Pain (4-6)  simethicone 80 milliGRAM(s) Chew every 4 hours PRN Gas  witch hazel Pads 1 Application(s) Topical every 4 hours PRN Perineal discomfort        Assessment and Plan  PPD #1 s/p   Doing well.  Encourage ambulation.  PP & PPD Instructions reviewed.  CPC.  Mood stable and appropriate   will continue lexapro   D/C home today.

## 2024-12-31 LAB — GLUCOSE BLDC GLUCOMTR-MCNC: 116 MG/DL — HIGH (ref 70–99)
